# Patient Record
Sex: FEMALE | Race: BLACK OR AFRICAN AMERICAN | Employment: OTHER
[De-identification: names, ages, dates, MRNs, and addresses within clinical notes are randomized per-mention and may not be internally consistent; named-entity substitution may affect disease eponyms.]

---

## 2024-01-01 ENCOUNTER — HOME CARE VISIT (OUTPATIENT)
Facility: HOME HEALTH | Age: 89
End: 2024-01-01
Payer: MEDICARE

## 2024-01-01 ENCOUNTER — HOME CARE VISIT (OUTPATIENT)
Age: 89
End: 2024-01-01
Payer: MEDICARE

## 2024-01-01 VITALS — TEMPERATURE: 97.8 F | DIASTOLIC BLOOD PRESSURE: 30 MMHG | SYSTOLIC BLOOD PRESSURE: 88 MMHG | RESPIRATION RATE: 28 BRPM

## 2024-01-01 VITALS
DIASTOLIC BLOOD PRESSURE: 54 MMHG | OXYGEN SATURATION: 94 % | TEMPERATURE: 100.9 F | SYSTOLIC BLOOD PRESSURE: 72 MMHG | RESPIRATION RATE: 30 BRPM | HEART RATE: 60 BPM

## 2024-01-01 VITALS
DIASTOLIC BLOOD PRESSURE: 65 MMHG | HEART RATE: 59 BPM | RESPIRATION RATE: 11 BRPM | TEMPERATURE: 94.7 F | SYSTOLIC BLOOD PRESSURE: 120 MMHG

## 2024-01-01 VITALS
OXYGEN SATURATION: 96 % | HEART RATE: 64 BPM | DIASTOLIC BLOOD PRESSURE: 68 MMHG | SYSTOLIC BLOOD PRESSURE: 103 MMHG | TEMPERATURE: 96.9 F | RESPIRATION RATE: 10 BRPM

## 2024-01-01 PROCEDURE — G0299 HHS/HOSPICE OF RN EA 15 MIN: HCPCS

## 2024-01-01 ASSESSMENT — ENCOUNTER SYMPTOMS
COUGH: 1
COUGH CHARACTERISTICS: CONGESTED
BOWEL INCONTINENCE: 1

## 2024-05-16 ENCOUNTER — APPOINTMENT (OUTPATIENT)
Facility: HOSPITAL | Age: 89
DRG: 884 | End: 2024-05-16
Payer: MEDICARE

## 2024-05-16 ENCOUNTER — HOSPITAL ENCOUNTER (INPATIENT)
Facility: HOSPITAL | Age: 89
LOS: 4 days | Discharge: HOSPICE | DRG: 884 | End: 2024-05-20
Attending: EMERGENCY MEDICINE | Admitting: FAMILY MEDICINE
Payer: MEDICARE

## 2024-05-16 DIAGNOSIS — T68.XXXA HYPOTHERMIA, INITIAL ENCOUNTER: ICD-10-CM

## 2024-05-16 DIAGNOSIS — R41.82 ALTERED MENTAL STATUS, UNSPECIFIED ALTERED MENTAL STATUS TYPE: Primary | ICD-10-CM

## 2024-05-16 DIAGNOSIS — I95.9 HYPOTENSION, UNSPECIFIED HYPOTENSION TYPE: ICD-10-CM

## 2024-05-16 LAB
ALBUMIN SERPL-MCNC: 2.8 G/DL (ref 3.5–5)
ALBUMIN/GLOB SERPL: 0.8 (ref 1.1–2.2)
ALP SERPL-CCNC: 157 U/L (ref 45–117)
ALT SERPL-CCNC: 156 U/L (ref 12–78)
ANION GAP SERPL CALC-SCNC: 7 MMOL/L (ref 5–15)
APPEARANCE UR: CLEAR
AST SERPL-CCNC: 108 U/L (ref 15–37)
BACTERIA URNS QL MICRO: NEGATIVE /HPF
BASOPHILS # BLD: 0 K/UL (ref 0–0.1)
BASOPHILS NFR BLD: 0 % (ref 0–1)
BILIRUB SERPL-MCNC: 0.9 MG/DL (ref 0.2–1)
BILIRUB UR QL: NEGATIVE
BUN SERPL-MCNC: 23 MG/DL (ref 6–20)
BUN/CREAT SERPL: 26 (ref 12–20)
CALCIUM SERPL-MCNC: 10.2 MG/DL (ref 8.5–10.1)
CHLORIDE SERPL-SCNC: 110 MMOL/L (ref 97–108)
CO2 SERPL-SCNC: 24 MMOL/L (ref 21–32)
COLOR UR: NORMAL
COMMENT:: NORMAL
CREAT SERPL-MCNC: 0.87 MG/DL (ref 0.55–1.02)
DIFFERENTIAL METHOD BLD: ABNORMAL
EOSINOPHIL # BLD: 0.1 K/UL (ref 0–0.4)
EOSINOPHIL NFR BLD: 2 % (ref 0–7)
EPITH CASTS URNS QL MICRO: NORMAL /LPF
ERYTHROCYTE [DISTWIDTH] IN BLOOD BY AUTOMATED COUNT: 17.7 % (ref 11.5–14.5)
GLOBULIN SER CALC-MCNC: 3.7 G/DL (ref 2–4)
GLUCOSE SERPL-MCNC: 150 MG/DL (ref 65–100)
GLUCOSE UR STRIP.AUTO-MCNC: NEGATIVE MG/DL
HCT VFR BLD AUTO: 40.2 % (ref 35–47)
HGB BLD-MCNC: 13.2 G/DL (ref 11.5–16)
HGB UR QL STRIP: NEGATIVE
HYALINE CASTS URNS QL MICRO: NORMAL /LPF (ref 0–5)
IMM GRANULOCYTES # BLD AUTO: 0 K/UL (ref 0–0.04)
IMM GRANULOCYTES NFR BLD AUTO: 0 % (ref 0–0.5)
KETONES UR QL STRIP.AUTO: NEGATIVE MG/DL
LACTATE SERPL-SCNC: 1.2 MMOL/L (ref 0.4–2)
LACTATE SERPL-SCNC: 2.4 MMOL/L (ref 0.4–2)
LEUKOCYTE ESTERASE UR QL STRIP.AUTO: NEGATIVE
LYMPHOCYTES # BLD: 0.8 K/UL (ref 0.8–3.5)
LYMPHOCYTES NFR BLD: 24 % (ref 12–49)
MCH RBC QN AUTO: 28.8 PG (ref 26–34)
MCHC RBC AUTO-ENTMCNC: 32.8 G/DL (ref 30–36.5)
MCV RBC AUTO: 87.6 FL (ref 80–99)
MONOCYTES # BLD: 0.2 K/UL (ref 0–1)
MONOCYTES NFR BLD: 6 % (ref 5–13)
NEUTS SEG # BLD: 2.1 K/UL (ref 1.8–8)
NEUTS SEG NFR BLD: 68 % (ref 32–75)
NITRITE UR QL STRIP.AUTO: NEGATIVE
NRBC # BLD: 0 K/UL (ref 0–0.01)
NRBC BLD-RTO: 0 PER 100 WBC
PH UR STRIP: 5 (ref 5–8)
PLATELET # BLD AUTO: 56 K/UL (ref 150–400)
PLATELET COMMENT: ABNORMAL
POTASSIUM SERPL-SCNC: 4.3 MMOL/L (ref 3.5–5.1)
PROT SERPL-MCNC: 6.5 G/DL (ref 6.4–8.2)
PROT UR STRIP-MCNC: NEGATIVE MG/DL
RBC # BLD AUTO: 4.59 M/UL (ref 3.8–5.2)
RBC #/AREA URNS HPF: NORMAL /HPF (ref 0–5)
RBC MORPH BLD: ABNORMAL
RBC MORPH BLD: ABNORMAL
SODIUM SERPL-SCNC: 141 MMOL/L (ref 136–145)
SP GR UR REFRACTOMETRY: 1.01 (ref 1–1.03)
SPECIMEN HOLD: NORMAL
TSH SERPL DL<=0.05 MIU/L-ACNC: 4.59 UIU/ML (ref 0.36–3.74)
URINE CULTURE IF INDICATED: NORMAL
UROBILINOGEN UR QL STRIP.AUTO: 0.2 EU/DL (ref 0.2–1)
WBC # BLD AUTO: 3.2 K/UL (ref 3.6–11)
WBC URNS QL MICRO: NORMAL /HPF (ref 0–4)

## 2024-05-16 PROCEDURE — 96361 HYDRATE IV INFUSION ADD-ON: CPT

## 2024-05-16 PROCEDURE — 6360000002 HC RX W HCPCS: Performed by: EMERGENCY MEDICINE

## 2024-05-16 PROCEDURE — 2580000003 HC RX 258: Performed by: FAMILY MEDICINE

## 2024-05-16 PROCEDURE — 36415 COLL VENOUS BLD VENIPUNCTURE: CPT

## 2024-05-16 PROCEDURE — 96360 HYDRATION IV INFUSION INIT: CPT

## 2024-05-16 PROCEDURE — 84443 ASSAY THYROID STIM HORMONE: CPT

## 2024-05-16 PROCEDURE — 2060000000 HC ICU INTERMEDIATE R&B

## 2024-05-16 PROCEDURE — 71045 X-RAY EXAM CHEST 1 VIEW: CPT

## 2024-05-16 PROCEDURE — 83605 ASSAY OF LACTIC ACID: CPT

## 2024-05-16 PROCEDURE — 87040 BLOOD CULTURE FOR BACTERIA: CPT

## 2024-05-16 PROCEDURE — 80053 COMPREHEN METABOLIC PANEL: CPT

## 2024-05-16 PROCEDURE — 6360000002 HC RX W HCPCS: Performed by: FAMILY MEDICINE

## 2024-05-16 PROCEDURE — 70450 CT HEAD/BRAIN W/O DYE: CPT

## 2024-05-16 PROCEDURE — 99285 EMERGENCY DEPT VISIT HI MDM: CPT

## 2024-05-16 PROCEDURE — 85025 COMPLETE CBC W/AUTO DIFF WBC: CPT

## 2024-05-16 PROCEDURE — 2580000003 HC RX 258: Performed by: EMERGENCY MEDICINE

## 2024-05-16 PROCEDURE — 74177 CT ABD & PELVIS W/CONTRAST: CPT

## 2024-05-16 PROCEDURE — 81001 URINALYSIS AUTO W/SCOPE: CPT

## 2024-05-16 PROCEDURE — 6360000004 HC RX CONTRAST MEDICATION: Performed by: STUDENT IN AN ORGANIZED HEALTH CARE EDUCATION/TRAINING PROGRAM

## 2024-05-16 RX ORDER — SODIUM CHLORIDE, SODIUM LACTATE, POTASSIUM CHLORIDE, AND CALCIUM CHLORIDE .6; .31; .03; .02 G/100ML; G/100ML; G/100ML; G/100ML
1000 INJECTION, SOLUTION INTRAVENOUS ONCE
Status: COMPLETED | OUTPATIENT
Start: 2024-05-16 | End: 2024-05-16

## 2024-05-16 RX ORDER — ENOXAPARIN SODIUM 100 MG/ML
40 INJECTION SUBCUTANEOUS DAILY
Status: DISCONTINUED | OUTPATIENT
Start: 2024-05-16 | End: 2024-05-16

## 2024-05-16 RX ORDER — 0.9 % SODIUM CHLORIDE 0.9 %
500 INTRAVENOUS SOLUTION INTRAVENOUS ONCE
Status: COMPLETED | OUTPATIENT
Start: 2024-05-16 | End: 2024-05-16

## 2024-05-16 RX ORDER — SODIUM CHLORIDE 9 MG/ML
INJECTION, SOLUTION INTRAVENOUS PRN
Status: DISCONTINUED | OUTPATIENT
Start: 2024-05-16 | End: 2024-05-20 | Stop reason: HOSPADM

## 2024-05-16 RX ORDER — POLYETHYLENE GLYCOL 3350 17 G/17G
17 POWDER, FOR SOLUTION ORAL DAILY PRN
Status: DISCONTINUED | OUTPATIENT
Start: 2024-05-16 | End: 2024-05-20 | Stop reason: HOSPADM

## 2024-05-16 RX ORDER — SODIUM CHLORIDE, SODIUM LACTATE, POTASSIUM CHLORIDE, CALCIUM CHLORIDE 600; 310; 30; 20 MG/100ML; MG/100ML; MG/100ML; MG/100ML
INJECTION, SOLUTION INTRAVENOUS CONTINUOUS
Status: DISCONTINUED | OUTPATIENT
Start: 2024-05-16 | End: 2024-05-20 | Stop reason: HOSPADM

## 2024-05-16 RX ORDER — SODIUM CHLORIDE 0.9 % (FLUSH) 0.9 %
5-40 SYRINGE (ML) INJECTION EVERY 12 HOURS SCHEDULED
Status: DISCONTINUED | OUTPATIENT
Start: 2024-05-16 | End: 2024-05-20 | Stop reason: HOSPADM

## 2024-05-16 RX ORDER — ACETAMINOPHEN 325 MG/1
650 TABLET ORAL EVERY 6 HOURS PRN
Status: DISCONTINUED | OUTPATIENT
Start: 2024-05-16 | End: 2024-05-20 | Stop reason: HOSPADM

## 2024-05-16 RX ORDER — ONDANSETRON 2 MG/ML
4 INJECTION INTRAMUSCULAR; INTRAVENOUS EVERY 6 HOURS PRN
Status: DISCONTINUED | OUTPATIENT
Start: 2024-05-16 | End: 2024-05-20 | Stop reason: HOSPADM

## 2024-05-16 RX ORDER — ONDANSETRON 4 MG/1
4 TABLET, ORALLY DISINTEGRATING ORAL EVERY 8 HOURS PRN
Status: DISCONTINUED | OUTPATIENT
Start: 2024-05-16 | End: 2024-05-20 | Stop reason: HOSPADM

## 2024-05-16 RX ORDER — POTASSIUM CHLORIDE 7.45 MG/ML
10 INJECTION INTRAVENOUS PRN
Status: DISCONTINUED | OUTPATIENT
Start: 2024-05-16 | End: 2024-05-20 | Stop reason: HOSPADM

## 2024-05-16 RX ORDER — MAGNESIUM SULFATE IN WATER 40 MG/ML
2000 INJECTION, SOLUTION INTRAVENOUS PRN
Status: DISCONTINUED | OUTPATIENT
Start: 2024-05-16 | End: 2024-05-20 | Stop reason: HOSPADM

## 2024-05-16 RX ORDER — POTASSIUM CHLORIDE 750 MG/1
40 TABLET, FILM COATED, EXTENDED RELEASE ORAL PRN
Status: DISCONTINUED | OUTPATIENT
Start: 2024-05-16 | End: 2024-05-20 | Stop reason: HOSPADM

## 2024-05-16 RX ORDER — ACETAMINOPHEN 650 MG/1
650 SUPPOSITORY RECTAL EVERY 6 HOURS PRN
Status: DISCONTINUED | OUTPATIENT
Start: 2024-05-16 | End: 2024-05-20 | Stop reason: HOSPADM

## 2024-05-16 RX ORDER — SODIUM CHLORIDE 0.9 % (FLUSH) 0.9 %
5-40 SYRINGE (ML) INJECTION PRN
Status: DISCONTINUED | OUTPATIENT
Start: 2024-05-16 | End: 2024-05-20 | Stop reason: HOSPADM

## 2024-05-16 RX ADMIN — PIPERACILLIN AND TAZOBACTAM 4500 MG: 4; .5 INJECTION, POWDER, LYOPHILIZED, FOR SOLUTION INTRAVENOUS at 15:48

## 2024-05-16 RX ADMIN — SODIUM CHLORIDE 500 ML: 9 INJECTION, SOLUTION INTRAVENOUS at 13:29

## 2024-05-16 RX ADMIN — IOPAMIDOL 80 ML: 755 INJECTION, SOLUTION INTRAVENOUS at 16:18

## 2024-05-16 RX ADMIN — SODIUM CHLORIDE, POTASSIUM CHLORIDE, SODIUM LACTATE AND CALCIUM CHLORIDE 1000 ML: 600; 310; 30; 20 INJECTION, SOLUTION INTRAVENOUS at 15:48

## 2024-05-16 RX ADMIN — SODIUM CHLORIDE 1250 MG: 9 INJECTION, SOLUTION INTRAVENOUS at 18:04

## 2024-05-16 RX ADMIN — PIPERACILLIN SODIUM AND TAZOBACTAM SODIUM 3375 MG: 3; .375 INJECTION, POWDER, LYOPHILIZED, FOR SOLUTION INTRAVENOUS at 21:40

## 2024-05-16 RX ADMIN — SODIUM CHLORIDE, POTASSIUM CHLORIDE, SODIUM LACTATE AND CALCIUM CHLORIDE: 600; 310; 30; 20 INJECTION, SOLUTION INTRAVENOUS at 18:08

## 2024-05-16 RX ADMIN — SODIUM CHLORIDE, PRESERVATIVE FREE 10 ML: 5 INJECTION INTRAVENOUS at 21:38

## 2024-05-16 NOTE — ED PROVIDER NOTES
[facility] EMERGENCY DEP  EMERGENCY DEPARTMENT ENCOUNTER      Pt Name: Lina Durant  MRN: [id number]  Birthdate 10/5/1933  Date of evaluation: 5/16/2024  Provider: Matthew Brantley MD      HISTORY OF PRESENT ILLNESS      89-year-old female with history of hypertension presents to the emergency department with daughter with concern for altered mental status.  Daughter reports worsening p.o. intake, decreased conversation, may be indicating some headaches and abdominal pain for least the last several days.    The history is provided by medical records and a relative.           Nursing Notes were reviewed.    REVIEW OF SYSTEMS         Review of Systems        PAST MEDICAL HISTORY   No past medical history on file.      SURGICAL HISTORY     No past surgical history on file.      CURRENT MEDICATIONS       Previous Medications    No medications on file       ALLERGIES     Patient has no known allergies.    FAMILY HISTORY     No family history on file.       SOCIAL HISTORY       Social History     Socioeconomic History    Marital status: Single         PHYSICAL EXAM       ED Triage Vitals [05/16/24 1156]   BP Temp Temp src Pulse Respirations SpO2 Height Weight - Scale   117/88 -- -- 62 16 -- 1.6 m (5' 3\") 52.2 kg (115 lb)       Body mass index is 20.37 kg/m².    Physical Exam  Vitals and nursing note reviewed.   Constitutional:       General: She is not in acute distress.     Appearance: She is not ill-appearing.      Comments: Patient looking around room, regards me as I enter the room and examined her, moves all 4 extremities but does not interact with me   HENT:      Mouth/Throat:      Mouth: Mucous membranes are moist.   Pulmonary:      Effort: No respiratory distress.   Neurological:      General: No focal deficit present.             EMERGENCY DEPARTMENT COURSE and DIFFERENTIAL DIAGNOSIS/MDM:   Vitals:    Vitals:    05/16/24 1156   BP: 117/88   Pulse: 62   Resp: 16   Weight: 52.2 kg (115 lb)   Height: 1.6 m (5' 3\")

## 2024-05-16 NOTE — H&P
History and Physical    Date of Service:  5/16/2024  Primary Care Provider: Hai Valera MD  Source of information: The patient and Chart review    Chief Complaint: Altered Mental Status      History of Presenting Illness:   Lina Durant is a 89 y.o. female who presents with concern of altered mental status.  Patient presented to the emergency room accompanied by her daughter, patient has been lethargic and having more weakness and needing assistance with her ambulation.  Patient has advanced dementia and daughter takes care of the patient at home.  According to the daughter, patient has been falling intermittently since September of last year, however patient maintaining some independence with ADLs.  Over the last few days, patient with significantly poor p.o. intake, talking less, also needing to assist with feeding which is new.  Daughter also reports that patient may be having headache and abdominal pain for past few days.  Patient presented to the emergency room and found to be hypothermic with temperature of 90.9.  Labs are fairly unremarkable and no other indicators of sepsis.  Noted with elevated lactic acid level.  CT head was negative for acute pathology, UA was unremarkable, chest x-ray and CT abdomen and pelvis pending at this time.  Patient was started on broad-spectrum antibiotics with vancomycin and Zosyn, blood cultures were obtained and hospitalist service requested to admit the patient for further evaluation management.    The patient denies any headache, blurry vision, sore throat, trouble swallowing, trouble with speech, chest pain, SOB, cough, fever, chills, N/V/D, abd pain, urinary symptoms, constipation, recent travels, sick contacts, focal or generalized neurological symptoms, falls, injuries, rashes, contact with COVID-19 diagnosed patients, hematemesis, melena, hemoptysis, hematuria, rashes, denies starting any new medications and denies any other concerns or problems besides as  mentioned above.         REVIEW OF SYSTEMS:  A comprehensive review of systems was negative except for that written in the History of Present Illness.     No past medical history on file.   No past surgical history on file.  Prior to Admission medications    Not on File     No Known Allergies   No family history on file.   Social History:     Social Determinants of Health     Tobacco Use: Not on file   Alcohol Use: Not on file   Financial Resource Strain: Not on file   Food Insecurity: Not on file   Transportation Needs: Not on file   Physical Activity: Not on file   Stress: Not on file   Social Connections: Not on file   Intimate Partner Violence: Not on file   Depression: Not on file   Housing Stability: Not on file   Interpersonal Safety: Not on file   Utilities: Not on file        Medications were reconciled to the best of my ability given all available resources at the time of admission. Route is PO if not otherwise noted.     Family and social history were personally reviewed, all pertinent and relevant details are outlined as above.    Objective:   /88   Pulse 62   Temp (!) 90.9 °F (32.7 °C) (Rectal)   Resp 16   Ht 1.6 m (5' 3\")   Wt 52.2 kg (115 lb)   SpO2 98%   BMI 20.37 kg/m²         PHYSICAL EXAM:   General: Alert x oriented x 3, awake, no acute distress,   HEENT: PEERL, EOMI, moist mucus membranes  Neck: Supple, no JVD, no meningeal signs  Chest: Clear to auscultation bilaterally   CVS: RRR, S1 S2 heard, no murmurs/rubs/gallops  Abd: Soft, non-tender, non-distended, +bowel sounds   Ext: No clubbing, no cyanosis, no edema  Neuro/Psych: Pleasant mood and affect, CN 2-12 grossly intact, sensory grossly within normal limit, Strength 5/5 in all extremities, DTR 1+ x 4  Cap refill: Brisk, less than 3 seconds  Pulses: 2+, symmetric in all extremities  Skin: Warm, dry, without rashes or lesions    Data Review:   I have independently reviewed and interpreted patient's lab and all other diagnostic

## 2024-05-16 NOTE — ED NOTES
TRANSFER - OUT REPORT:    Verbal report given to Shwetha on Lina Durant  being transferred to [facility] for routine progression of patient care       Report consisted of patient's Situation, Background, Assessment and   Recommendations(SBAR).     Information from the following report(s) Nurse Handoff Report, Index, ED Encounter Summary, ED SBAR, and Adult Overview was reviewed with the receiving nurse.    [place] Fall Assessment:    Presents to emergency department  because of falls (Syncope, seizure, or loss of consciousness): Yes  Age > 70: Yes  Altered Mental Status, Intoxication with alcohol or substance confusion (Disorientation, impaired judgment, poor safety awaremess, or inability to follow instructions): Yes  Impaired Mobility: Ambulates or transfers with assistive devices or assistance; Unable to ambulate or transer.: Yes  Nursing Judgement: Yes          Lines:   Peripheral IV 05/16/24 Left;Proximal;Anterior Forearm (Active)       Peripheral IV 05/16/24 Right Forearm (Active)        Opportunity for questions and clarification was provided.      Patient transported with:  Monitor and Registered Nurse

## 2024-05-16 NOTE — ED TRIAGE NOTES
Pt to er via ems from home for c/o increased lethargy, slurred speech and difficulty with ADLS for the last few weeks. Pt has advanced stage dementia. Per daughter at bedside pt stated this morning that she had a stomach ache and a headache.

## 2024-05-16 NOTE — PROGRESS NOTES
Pharmacist Note - Vancomycin Dosing    Consult provided for this 89 y.o. female admitted to the ED with AMS & hypothermia.  Antibiotic regimen(s): Vanc + Zosyn    Recent Labs     24  1310   WBC 3.2*   CREATININE 0.87   BUN 23*     Frequency of BMP: daily x 3  Height: 160 cm  Weight: 52.2 kg  Est CrCl: 35 ml/min  Temp (24hrs), Av.6 °F (32.6 °C), Min:90.3 °F (32.4 °C), Max:90.9 °F (32.7 °C)    The plan below is expected to result in a target range of AUC/JULIET 400-600    A 1250 mg loading dose was ordered in the ED; to be followed by a maintenance dose of 1000 mg IV every 24 hours.  Pharmacy to follow patient daily and order levels / make dose adjustments as appropriate.    *Vancomycin has been dosed used Bayesian kinetics software to target an AUC/JULIET of 400-600, which provides adequate exposure for an assumed infection due to MRSA with an JULIET of 1 or less while reducing the risk of nephrotoxicity as seen with traditional trough based dosing goals.

## 2024-05-17 ENCOUNTER — HOSPICE ADMISSION (OUTPATIENT)
Age: 89
End: 2024-05-17
Payer: MEDICARE

## 2024-05-17 PROBLEM — Z51.5 PALLIATIVE CARE ENCOUNTER: Status: ACTIVE | Noted: 2024-05-17

## 2024-05-17 PROBLEM — R53.81 DEBILITY: Status: ACTIVE | Noted: 2024-05-17

## 2024-05-17 PROBLEM — F03.C0 SEVERE DEMENTIA WITHOUT BEHAVIORAL DISTURBANCE, PSYCHOTIC DISTURBANCE, MOOD DISTURBANCE, OR ANXIETY (HCC): Status: ACTIVE | Noted: 2024-05-17

## 2024-05-17 PROBLEM — R63.30 FEEDING DIFFICULTIES: Status: ACTIVE | Noted: 2024-05-17

## 2024-05-17 LAB
ANION GAP SERPL CALC-SCNC: 7 MMOL/L (ref 5–15)
BASE DEFICIT BLD-SCNC: 1.4 MMOL/L
BASOPHILS # BLD: 0 K/UL (ref 0–0.1)
BASOPHILS NFR BLD: 0 % (ref 0–1)
BUN SERPL-MCNC: 21 MG/DL (ref 6–20)
BUN/CREAT SERPL: 19 (ref 12–20)
CALCIUM SERPL-MCNC: 9.8 MG/DL (ref 8.5–10.1)
CHLORIDE SERPL-SCNC: 113 MMOL/L (ref 97–108)
CO2 SERPL-SCNC: 24 MMOL/L (ref 21–32)
CREAT SERPL-MCNC: 1.09 MG/DL (ref 0.55–1.02)
DIFFERENTIAL METHOD BLD: ABNORMAL
EOSINOPHIL # BLD: 0 K/UL (ref 0–0.4)
EOSINOPHIL NFR BLD: 1 % (ref 0–7)
ERYTHROCYTE [DISTWIDTH] IN BLOOD BY AUTOMATED COUNT: 17.2 % (ref 11.5–14.5)
GLUCOSE BLD STRIP.AUTO-MCNC: 81 MG/DL (ref 65–117)
GLUCOSE SERPL-MCNC: 67 MG/DL (ref 65–100)
HCO3 BLD-SCNC: 23 MMOL/L (ref 22–26)
HCT VFR BLD AUTO: 33.9 % (ref 35–47)
HGB BLD-MCNC: 11 G/DL (ref 11.5–16)
IMM GRANULOCYTES # BLD AUTO: 0 K/UL (ref 0–0.04)
IMM GRANULOCYTES NFR BLD AUTO: 0 % (ref 0–0.5)
LYMPHOCYTES # BLD: 0.9 K/UL (ref 0.8–3.5)
LYMPHOCYTES NFR BLD: 25 % (ref 12–49)
MCH RBC QN AUTO: 28.4 PG (ref 26–34)
MCHC RBC AUTO-ENTMCNC: 32.4 G/DL (ref 30–36.5)
MCV RBC AUTO: 87.4 FL (ref 80–99)
MONOCYTES # BLD: 0.4 K/UL (ref 0–1)
MONOCYTES NFR BLD: 11 % (ref 5–13)
NEUTS SEG # BLD: 2.2 K/UL (ref 1.8–8)
NEUTS SEG NFR BLD: 63 % (ref 32–75)
NRBC # BLD: 0 K/UL (ref 0–0.01)
NRBC BLD-RTO: 0 PER 100 WBC
PCO2 BLD: 36.7 MMHG (ref 35–45)
PH BLD: 7.41 (ref 7.35–7.45)
PLATELET # BLD AUTO: 59 K/UL (ref 150–400)
PO2 BLD: 72 MMHG (ref 80–100)
POTASSIUM SERPL-SCNC: 4.4 MMOL/L (ref 3.5–5.1)
RBC # BLD AUTO: 3.88 M/UL (ref 3.8–5.2)
RBC MORPH BLD: ABNORMAL
SAO2 % BLD: 94.3 % (ref 92–97)
SERVICE CMNT-IMP: NORMAL
SODIUM SERPL-SCNC: 144 MMOL/L (ref 136–145)
SPECIMEN TYPE: ABNORMAL
WBC # BLD AUTO: 3.5 K/UL (ref 3.6–11)

## 2024-05-17 PROCEDURE — 36600 WITHDRAWAL OF ARTERIAL BLOOD: CPT

## 2024-05-17 PROCEDURE — 99223 1ST HOSP IP/OBS HIGH 75: CPT | Performed by: PHYSICAL MEDICINE & REHABILITATION

## 2024-05-17 PROCEDURE — 85025 COMPLETE CBC W/AUTO DIFF WBC: CPT

## 2024-05-17 PROCEDURE — 2580000003 HC RX 258: Performed by: FAMILY MEDICINE

## 2024-05-17 PROCEDURE — 36415 COLL VENOUS BLD VENIPUNCTURE: CPT

## 2024-05-17 PROCEDURE — 2060000000 HC ICU INTERMEDIATE R&B

## 2024-05-17 PROCEDURE — 82962 GLUCOSE BLOOD TEST: CPT

## 2024-05-17 PROCEDURE — 92610 EVALUATE SWALLOWING FUNCTION: CPT | Performed by: SPEECH-LANGUAGE PATHOLOGIST

## 2024-05-17 PROCEDURE — 82803 BLOOD GASES ANY COMBINATION: CPT

## 2024-05-17 PROCEDURE — 2580000003 HC RX 258

## 2024-05-17 PROCEDURE — 80048 BASIC METABOLIC PNL TOTAL CA: CPT

## 2024-05-17 PROCEDURE — 6360000002 HC RX W HCPCS: Performed by: FAMILY MEDICINE

## 2024-05-17 RX ORDER — DEXTROSE MONOHYDRATE AND SODIUM CHLORIDE 5; .9 G/100ML; G/100ML
INJECTION, SOLUTION INTRAVENOUS CONTINUOUS
Status: DISCONTINUED | OUTPATIENT
Start: 2024-05-17 | End: 2024-05-19

## 2024-05-17 RX ADMIN — PIPERACILLIN SODIUM AND TAZOBACTAM SODIUM 3375 MG: 3; .375 INJECTION, POWDER, LYOPHILIZED, FOR SOLUTION INTRAVENOUS at 21:00

## 2024-05-17 RX ADMIN — PIPERACILLIN SODIUM AND TAZOBACTAM SODIUM 3375 MG: 3; .375 INJECTION, POWDER, LYOPHILIZED, FOR SOLUTION INTRAVENOUS at 06:18

## 2024-05-17 RX ADMIN — PIPERACILLIN SODIUM AND TAZOBACTAM SODIUM 3375 MG: 3; .375 INJECTION, POWDER, LYOPHILIZED, FOR SOLUTION INTRAVENOUS at 14:18

## 2024-05-17 RX ADMIN — SODIUM CHLORIDE, PRESERVATIVE FREE 10 ML: 5 INJECTION INTRAVENOUS at 21:01

## 2024-05-17 RX ADMIN — SODIUM CHLORIDE, POTASSIUM CHLORIDE, SODIUM LACTATE AND CALCIUM CHLORIDE: 600; 310; 30; 20 INJECTION, SOLUTION INTRAVENOUS at 05:14

## 2024-05-17 RX ADMIN — DEXTROSE AND SODIUM CHLORIDE: 5; 900 INJECTION, SOLUTION INTRAVENOUS at 12:19

## 2024-05-17 RX ADMIN — SODIUM CHLORIDE, PRESERVATIVE FREE 10 ML: 5 INJECTION INTRAVENOUS at 08:52

## 2024-05-17 NOTE — CARE COORDINATION
CM acknowledges order for hospice info session. Pt sent referral to [facility] for info session via [facility]. CM will continue to follow and provide support as needed.

## 2024-05-17 NOTE — CONSULTS
Palliative Medicine  Patient Name: Lina Durant  YOB: 1933  MRN: [id number]  Age: 89 y.o.  Gender: female    Date of Initial Consult: 5/17/24  Date of Service: 5/17/2024  Time: 3:27 PM  Provider: Amelia Ashton MD  Hospital Day: 2  Admit Date: 5/16/2024  Referring Provider: Dr Velazco       Reasons for Consultation:  Goals of Care    HISTORY OF PRESENT ILLNESS (HPI):   Lina Durant is a 89 y.o. female with a past medical history of advanced dementia dx ~ 2009 w/ falls intermittently since 9/2023 who was admitted on 5/16/2024 from home after dtr Kianna noticing poor po intake, decr speaking, abdominal pain over past few days. Hypothermic in ED. CT of head wnl, CT A/P w/ no source of infection. On IV abx. RRT called for AMS- MRI ordered, although also considering end stage dementia as contributing factor.     Dtr states that when saw Neurology a few months ago was told there was nothing else that could be done, that she has end stage dementia. Has declined significantly over past few weeks, needing more assistance, not always recognizing family, pocketing food.       Psychosocial: Pt lived at the [facility] for 20 years, moved into dtr's home 2/2024. Dtr Kianna is only child. Pt has always lived in [place], one of 8 children- 3 still living. 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     PALLIATIVE DIAGNOSES:    Severe dementia  Debility   Feeding difficulties  Palliative care encounter    ASSESSMENT AND PLAN:   Meet w/ pt who is unarousable to voice, has not been able to work w/ therapies.   Supportive dtr Kianna at bedside- she is next of kin, only child.   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   Moved in w/ dtr Kianna 2/2024 and up until recently could still get OOB w/ assistance and was

## 2024-05-17 NOTE — CARE COORDINATION
Care Management Initial Assessment       RUR: 11%  Readmission? No  1st IM letter given? Yes - 5/17/24  1st  letter given: No      CM at the bedside with pt and pt daughter to verify insurance info and demographics. Pt currently sedated and unable to be assessed. Bedside nurse made aware.     Pt currently lives with her daughter in a 2 level home and the pt resides downstairs. Pt needs standby assistance inside and outside she uses a wheelchair. She needs assistance with ADLs as well. Pt has WC (baseline), rollator (unable to use any longer), and cane (unable to use any longer). Pt has no hx of SNF/IPR/HH. Pt daughter open to PT/OT recommendations and would like rehabilitation for her mother. CM provided explanation that PT/OT will evaluate and recommend what is best for pt and what she is able to tolerate. Pt daughter verbalized understanding. For now, pt daughter has no preferences in agency or facilities. Pt daughter interested in [facility] and would like more information on personal aides at home as well. CM will provide personal aide list as well. PCP confirmed. CM will continue to follow.      05/17/24 1252   Service Assessment   Patient Orientation Sedated;Unresponsive;Other (see comment)  (Spoke to daughter Kianna)   Cognition Dementia / Early Alzheimer's   History Provided By Child/Family   Primary Caregiver Family   Accompanied By/Relationship Asha Wetzel / Daughter   Support Systems Children   Patient's Healthcare Decision Maker is: Legal Next of Kin   PCP Verified by CM Yes   Last Visit to PCP Within last 3 months   Prior Functional Level Assistance with the following:;Bathing;Dressing;Toileting;Feeding;Cooking;Housework;Shopping;Mobility   Current Functional Level Assistance with the following:;Bathing;Dressing;Toileting;Feeding;Cooking;Shopping;Housework;Mobility   Can patient return to prior living arrangement Unknown at present   Ability to make needs known: Poor   Family able to assist with home

## 2024-05-17 NOTE — PLAN OF CARE
Speech LAnguage Pathology EVALUATION    Patient: Lina Durant (89 y.o. female)  Date: 5/17/2024  Primary Diagnosis: Hypothermia, initial encounter [T68.XXXA]  Hypotension, unspecified hypotension type [I95.9]  Altered mental status, unspecified altered mental status type [R41.82]       Precautions:  fall                    ASSESSMENT :  Patient admitted with decreased responsiveness and hypothermia. Head CT \"No acute abnormality.\" Chest X ray \"IMPRESSION: 1. The lungs demonstrate reticulation at the lung bases suggesting fibrosis. 2. Cardiomegaly\" . Per discussion with patient's daughter, she was diagnosed with dementia in 2006. Up until February of this year, she was eating and drinking reasonably well. Her daughter noticed some decline and moved the patient into her home to better care for her. The patient has shown pocketing of food and medications for about 2 months. She stopped having the ability to self feed this past weekend. Speech has been intermittently distorted. The patient has had multiple falls.   Patient is currently lethargic. She did not rouse to her daughter washing her face. Brief eye opening with repositioning in bed. No command following. She opened her mouth to accept an ice chip, though when an ice chip was placed in her mouth, it immediately fell out. No further attempts given lethargy.   Explained to daughter that patient's lethargy places her at increased risk for aspiration. Daughter understands. She was asking what the next steps are as they relate to her mother's nutritional status. Explained that a decline in PO intake with pocketing and eventual inability to swallow is often seen as a part of the dementia process. If this is the case, we do not expect improvement. If there is an underlying medical condition that would explain her lethargy and lack of acceptance of PO, we would expect improvement with treatment of that condition. Encouraged her to continue this conversation with her  Stefany dominique, SLP  Minutes: 20   Problem: SLP Adult - Impaired Swallowing  Goal: By Discharge: Advance to least restrictive diet without signs or symptoms of aspiration for planned discharge setting.  See evaluation for individualized goals.  Description: Initiated 5/17/2024  1. Patient will participated in re-assessment of swallow function within 7 days.   Outcome: Progressing

## 2024-05-17 NOTE — CONSULTS
Comprehensive Nutrition Assessment    Type and Reason for Visit: Initial, Consult    Nutrition Recommendations/Plan:     Agree with palliative involvement to assist with goals of care  For now, if alertness improves and diet initiated, assist with feeding.  Start ONS (chocolate) at that time for ease/concentration of nutrients in order to maximize intake.    Continue IV hydration for now - but consider adjusting to dex containing for protein sparing calories.  Would try to avoid NG placement/ nutrition support.         Malnutrition Assessment:  Malnutrition Status:  Severe malnutrition (05/17/24 1402)    Context:  Chronic Illness     Findings of the 6 clinical characteristics of malnutrition:  Energy Intake:  75% or less estimated energy requirements for 1 month or longer  Weight Loss:  Unable to assess     Body Fat Loss:  Unable to assess     Muscle Mass Loss:  Severe muscle mass loss Temples (temporalis), Clavicles (pectoralis & deltoids), Hand (interosseous)  Fluid Accumulation:  No significant fluid accumulation     Strength:  Not Performed       Nutrition Assessment:    PMHx includes end stage dementia.  Pt admitted with hypothermia, AMS/acute metabolic encephalopathy.    RD consulted for poor PO intake.  Palliative consult for goals also pending.  SLP abdi this AM reviewed: recommends NPO with pt's lethargy.  OK to adjust diet if alertness improves or if goals shift towards comfort.  Daughter reported that up until February of this year, pt was eating and drinking reasonably well.  When daughter noticed decline, pt was moved in with her and she noted that pt had been pocketing her food and meds for past 2 months.  As of this weekend, pt was no longer able to feed herself.  Pt is edentulous and tends to not wear, so they are at home.    When I spoke with daughter a little later, she reports that pt was actually still eating/ drinking reasonably well until this past weekend.    She states that she would  prepare the meals and pt would feed herself.  Eating things like chicken, PB&J sandwiches, hamburgers, hot dogs - even without her teeth.  Just cannot do salads/ stringy items without dentures.  Daughter is not sure of wt hx/ UBW, but does not that pt has been gradually losing weight over the years and was 95 lb at MD office a few ago.  Pt does exhibit significant muscle wasting and has very little SQ fat.    Daughter has offered Ensure in the past and pt likes chocolate.  However, tends to give her loose stools, so daughter has to time it right when she gives it.  Pt also gets loose stools from applesauce, bananas, and Nutrigrain bars.  She states loose stools have also gradually become a thing with these foods.  At this time, she is told that pt is constipated, so thinks Ensure might assist with this.    Agree with palliative care involvement.  If diet is unable to be advanced, given end stage dementia, nutrition support is contraindicated.      Nutritionally Significant Medications:  Zosyn, vancomycin, LR@ 75 mL/hr      Estimated Daily Nutrient Needs:  Energy Requirements Based On: Kcal/kg  Weight Used for Energy Requirements: Admission  Energy (kcal/day): 1300 (25 kcal/kg)  Weight Used for Protein Requirements: Admission  Protein (g/day): 50-60 (1-1.2 gm/kg)     Fluid (ml/day): 1500 (older adult)    Nutrition Related Findings:   Edema: none                           Last BM:      Wounds:   Wound Type: None      Current Nutrition Therapies:  Diet: NPO  Supplements: NPO  Meal Intake:   No data found.  Supplement Intake:  No data found.      Anthropometric Measures:  Height: 160 cm (5' 3\")  Ideal Body Weight (IBW): 115 lbs (52 kg)    Admission Body Weight: 52.2 kg (115 lb)  Current Body Weight: 52.2 kg (115 lb 1.3 oz), 100.1 % IBW. Weight Source: Bed Scale  Current BMI (kg/m2): 20.4        Weight Adjustment For: No Adjustment                 BMI Categories: Underweight (BMI less than 22) age over 65    Wt Readings

## 2024-05-17 NOTE — SIGNIFICANT EVENT
Rapid Response Note     05/17/24 at 1150    A rapid response was called on this patient for Altered level of consciousness.    Response    Rapid Response Team at the bedside for evaluation.    Dr. Bahena responding at the bedside.    Shwetha LANGSTON is the primary nurse during this episode.    Situation    The patient was noted to be lethargic and having a left facial droop.  She is lying on her left side.  ABG performed.  MRI is ordered per the attending team.  This is the way the patient was presenting earlier today per the daughter.    The patient was left in the care of her primary nursing team.    Rapid Response end time approximately 1205        Sepsis Screening  Are two or more SIRS criteria present? No    Is the patient's history suggestive of a new infection? No    Communication with provider:    Was a Code Sepsis called at this encounter? No

## 2024-05-17 NOTE — PROGRESS NOTES
[facility]  Hospitalist Group                                                                                          Hospitalist Progress Note  Celine Simental PA-C  Answering service: [phone] OR [phone] from in house phone        Date of Service:  2024  NAME:  Lina Durant  :  10/5/1933  MRN:  [id number]       Admission Summary:   Lina Durant is a 89 y.o. female who presented to the ED on  with concern of altered mental status.  Patient presented to the emergency room accompanied by her daughter. Daughter stated the patient had been lethargic and having more weakness and needing assistance with her ambulation.  Patient has advanced dementia and daughter takes care of the patient at home.  According to the daughter, patient has been falling intermittently since September of last year, however patient maintaining some independence with ADLs.  Over the last few days, patient with significantly poor p.o. intake, talking less, also needing to assist with feeding which is new.  Daughter also reports that patient may be having headache and abdominal pain for past few days.      ED Course: Patient presented to the emergency room and found to be hypothermic with temperature of 90.9. Labs are fairly unremarkable and no other indicators of sepsis. Noted with elevated lactic acid level.  CT head was negative for acute pathology, UA was unremarkable, chest x-ray demonstrated reticulation at the lung bases suggesting fibrosis and cardiomegaly. CT abdomen and pelvis showed distal constipation with rectal distention, severe atherosclerosis of the aorta, and cardiomegaly with no source of infection identified.  Patient was started on broad-spectrum antibiotics with vancomycin and Zosyn, blood cultures were obtained and hospitalist service requested to admit the patient for further evaluation management.  Interval history / Subjective:   Upon seeing the patient on rounds, she was sleeping      ______________________________________________________________________  EXPECTED LENGTH OF STAY: Unable to retrieve estimated LOS  ACTUAL LENGTH OF STAY:          1                 Celine Simental PA-C

## 2024-05-17 NOTE — CONSULTS
Attempted to see patient thrice today and each time there was a family meeting/  other specialty guero  Discussed with Dr. Rivero  Given clinical conditions considering hospice    Consult has been canceled

## 2024-05-17 NOTE — PROGRESS NOTES
[facility]  Good Help to Those in Need  [phone]     Patient Name: Lina Durant  YOB: 1933  Age: 89 y.o.    [facility] RN Note:  Hospice consult received, reviewing chart. Will follow up with Unit Nurse and Care Manager to discuss plan of care, patient status and discharge disposition.    [facility] has confirmed [facility] home address is currently full. [facility] is unable to provide routine Hospice care at pt home address of:  [address] [place], [place] [id number]     [facility] will need to defer to another hospice company that is able to provide hospice support for patient and family.    Currently, patient does not appear to be having acute symptoms that would necessitate Hospice services as GIP. Pt is tolerating oral medications and has not received any PRN medications for symptoms.     Perfect [employer] message sent to  to alert.    Thank you for the opportunity to be of service to this patient.    Charley Blake RN, [facility]  Hospice Nurse Liaison  [phone] Mobile  [phone] Office  Available on Perfect Serve

## 2024-05-17 NOTE — PROGRESS NOTES
PT Contact Note    Pt consult received, orders acknowledged, chart reviewed. Discussed pt with RN, who states at this time, pt is unarousable, unable to participate in PT evaluation. Will defer eval at this time and plan to follow up as able and when patient is appropriate.    Thank you,  Lorena Alexander, PT, DPT

## 2024-05-17 NOTE — PROGRESS NOTES
Spiritual Care Assessment/Progress Note  [facility]    Name: Lina Duratn MRN: [id number]    Age: 89 y.o.     Sex: female   Language: English     Date: 5/17/2024            Total Time Calculated: 35 min              Spiritual Assessment begun in [facility]  Service Provided For: Family  Referral/Consult From: Palliative Care  Encounter Overview/Reason: Palliative Care    Spiritual beliefs:      [x] Involved in a luis tradition/spiritual practice: Mormonism      [] Supported by a luis community:      [] Claims no spiritual orientation:      [] Seeking spiritual identity:           [] Adheres to an individual form of spirituality:      [] Not able to assess:                Identified resources for coping and support system:   Support System: Children, Palliative Care       [x] Prayer                  [] Devotional reading               [] Music                  [] Guided Imagery     [] Pet visits                                        [] Other: (COMMENT)     Specific area/focus of visit   Encounter:    Crisis:    Spiritual/Emotional needs: Type: Spiritual Support  Ritual, Rites and Sacraments:    Grief, Loss, and Adjustments: Type: Anticipatory Grief  Ethics/Mediation:    Behavioral Health:    Palliative Care: Type: Palliative Care, Family Care  Advance Care Planning:      I visited Lina Durant for a palliative initial spiritual assessment. Her chart was consulted prior to the visit. I discussed her with palliative dr Ashton prior to the visit, as well.  The patient was asleep and remained so for the duration of my visit.   I met her only child, daughter Kianna at bedside. She was often tearful during the conversation processing the anticipatory grief incumbent to such a loss. Kianna is struggling with her mother's decline and her understanding that she is near the EOL. The patient lived at the [facility] until earlier this year when Kianna moved her into her home, believing she could no longer live

## 2024-05-18 LAB
ANION GAP SERPL CALC-SCNC: 5 MMOL/L (ref 5–15)
BUN SERPL-MCNC: 18 MG/DL (ref 6–20)
BUN/CREAT SERPL: 16 (ref 12–20)
CALCIUM SERPL-MCNC: 9.7 MG/DL (ref 8.5–10.1)
CHLORIDE SERPL-SCNC: 115 MMOL/L (ref 97–108)
CO2 SERPL-SCNC: 24 MMOL/L (ref 21–32)
CREAT SERPL-MCNC: 1.16 MG/DL (ref 0.55–1.02)
GLUCOSE SERPL-MCNC: 126 MG/DL (ref 65–100)
POTASSIUM SERPL-SCNC: 3.7 MMOL/L (ref 3.5–5.1)
SODIUM SERPL-SCNC: 144 MMOL/L (ref 136–145)
VANCOMYCIN SERPL-MCNC: 9.7 UG/ML

## 2024-05-18 PROCEDURE — 97166 OT EVAL MOD COMPLEX 45 MIN: CPT

## 2024-05-18 PROCEDURE — 80202 ASSAY OF VANCOMYCIN: CPT

## 2024-05-18 PROCEDURE — 97530 THERAPEUTIC ACTIVITIES: CPT

## 2024-05-18 PROCEDURE — 2580000003 HC RX 258: Performed by: FAMILY MEDICINE

## 2024-05-18 PROCEDURE — 97535 SELF CARE MNGMENT TRAINING: CPT

## 2024-05-18 PROCEDURE — 36415 COLL VENOUS BLD VENIPUNCTURE: CPT

## 2024-05-18 PROCEDURE — 6360000002 HC RX W HCPCS: Performed by: FAMILY MEDICINE

## 2024-05-18 PROCEDURE — 80048 BASIC METABOLIC PNL TOTAL CA: CPT

## 2024-05-18 PROCEDURE — 97161 PT EVAL LOW COMPLEX 20 MIN: CPT

## 2024-05-18 PROCEDURE — 2580000003 HC RX 258

## 2024-05-18 PROCEDURE — 1100000000 HC RM PRIVATE

## 2024-05-18 RX ADMIN — SODIUM CHLORIDE, PRESERVATIVE FREE 10 ML: 5 INJECTION INTRAVENOUS at 09:52

## 2024-05-18 RX ADMIN — PIPERACILLIN SODIUM AND TAZOBACTAM SODIUM 3375 MG: 3; .375 INJECTION, POWDER, LYOPHILIZED, FOR SOLUTION INTRAVENOUS at 06:50

## 2024-05-18 RX ADMIN — DEXTROSE AND SODIUM CHLORIDE: 5; 900 INJECTION, SOLUTION INTRAVENOUS at 09:14

## 2024-05-18 RX ADMIN — PIPERACILLIN SODIUM AND TAZOBACTAM SODIUM 3375 MG: 3; .375 INJECTION, POWDER, LYOPHILIZED, FOR SOLUTION INTRAVENOUS at 15:07

## 2024-05-18 RX ADMIN — VANCOMYCIN HYDROCHLORIDE 750 MG: 750 INJECTION, POWDER, LYOPHILIZED, FOR SOLUTION INTRAVENOUS at 09:53

## 2024-05-18 RX ADMIN — PIPERACILLIN SODIUM AND TAZOBACTAM SODIUM 3375 MG: 3; .375 INJECTION, POWDER, LYOPHILIZED, FOR SOLUTION INTRAVENOUS at 21:19

## 2024-05-18 NOTE — PLAN OF CARE
Problem: Occupational Therapy - Adult  Goal: By Discharge: Performs self-care activities at highest level of function for planned discharge setting.  See evaluation for individualized goals.  Description: FUNCTIONAL STATUS PRIOR TO ADMISSION:  Patient with hx of end stage dementia, lives with her daughter who assists with all ADLs/IADLs and mobility with no AD use. Daughter has been assisting her briefly ambulate and navigate the stairs, however significant functional decline recently with increased oral pocketing, assist for all functional tasks, and worsening cognition.     Occupational Therapy Goals:  Initiated 5/18/2024  1.  Patient will perform self-feeding with Moderate Assist within 7 day(s).  2.  Patient will perform seated grooming with Moderate Assist within 7 day(s).  3.  Patient will perform upper body dressing with Moderate Assist within 7 day(s).  4.  Patient will perform toilet transfers to/from [facility] with Maximal Assist within 7 day(s).  5.  Patient will perform all aspects of toileting with Maximal Assist within 7 day(s).  6.  Patient will participate in upper extremity therapeutic exercise/activities with Moderate Assist for 5 minutes within 7 day(s).    7.  Patient will utilize energy conservation techniques during functional activities with verbal, visual, and tactile cues within 7 day(s).    Outcome: Progressing     OCCUPATIONAL THERAPY EVALUATION    Patient: Lina Durant (89 y.o. female)  Date: 5/18/2024  Primary Diagnosis: Hypothermia, initial encounter [T68.XXXA]  Hypotension, unspecified hypotension type [I95.9]  Altered mental status, unspecified altered mental status type [R41.82]         Precautions: Fall Risk                  ASSESSMENT :  The patient is limited by decreased functional mobility, independence in ADLs, ROM, strength, body mechanics, activity tolerance, endurance, cognition (orientation, memory, safety awareness, command following, attention/concentration, sequencing,  Intervention(s):   nursing notified, rest, and repositioning    Activity Tolerance:   Good    After treatment:   Patient left in no apparent distress sitting up in chair, Call bell within reach, Bed/ chair alarm activated, and Caregiver / family present    COMMUNICATION/EDUCATION:   The patient's plan of care was discussed with: physical therapist and registered nurse    Patient Education  Education Given To: Patient;Family  Education Provided: Role of Therapy;Plan of Care;Precautions;ADL Adaptive Strategies;Transfer Training;Orientation;Equipment;Family Education;Fall Prevention Strategies;Mobility Training  Education Method: Demonstration;Verbal;Teach Back  Barriers to Learning: Cognition;Hearing  Education Outcome: Continued education needed    Thank you for this referral.  Nichol Avendaño, OTD, OTR/L  Minutes: 38    Occupational Therapy Evaluation Charge Determination   History Examination Decision-Making   MEDIUM Complexity : Expanded review of history including physical, cognitive and psychocial history  HIGH Complexity: 5 Performance deficits relating to physical, cognitive, or psychosocial skills that result in activity limitations and/or participation restrictions  HIGH Complexity: Patient presents with comorbidities that affect occupational performance.  Significant modifications of tasks or assistance (eg. physical or verbal) with assessment (s) is necessary to enable pt to complete evaluation   Based on the above components, the patient evaluation is determined to be of the following complexity level: Medium

## 2024-05-18 NOTE — PROGRESS NOTES
Pharmacist Note - Vancomycin Dosing  Therapy day 3  Indication: hypothermia  Current regimen: 1250 mg load on 5/16    Recent Labs     05/16/24  1310 05/17/24  0457 05/18/24  0549   WBC 3.2* 3.5*  --    CREATININE 0.87 1.09* 1.16*   BUN 23* 21* 18       A random vancomycin level of 9.7 mcg/mL was obtained ~37 hours from previous dose given.    Goal target range Trough 10-15 mcg/mL      Plan: Change to 750 mg IV x 1 and continue to dose by levels . Pharmacy will continue to monitor this patient daily for changes in clinical status and renal function.

## 2024-05-18 NOTE — ACP (ADVANCE CARE PLANNING)
Advance Care Planning     Advance Care Planning (ACP) Physician/NP/PA Conversation    Date of Conversation: 5/16/2024  Conducted with: Legal next of kin  Other persons present: Daughter (POA)    Healthcare Decision Maker:   Primary Decision Maker: Milka Wetzel - [place] - [phone]  Click here to complete Healthcare Decision Makers including selection of the Healthcare Decision Maker Relationship (ie \"Primary\").     Care Preferences:    Hospitalization:  \"If your health worsens and it becomes clear that your chance of recovery is unlikely, what would be your preference regarding hospitalization?\"  I, Celine Simental PA-C, made a recommendation, and the patient's daughter (POA) agrees to a DNR/DNI for the patient, but wants all medical management continued until conversation with hospice occurs.    Ventilation:  \"If you were unable to breath on your own and your chance of recovery was unlikely, what would be your preference about the use of a ventilator (breathing machine) if it was available to you?\"  The patient would NOT desire the use of a ventilator.    Resuscitation:  \"In the event your heart stopped as a result of an underlying serious health condition, would you want attempts made to restart your heart, or would you prefer a natural death?\"  No, do NOT attempt to resuscitate.    treatment goals, resuscitation preferences, end of life care preferences (vegetative state/imminent death), and hospice care    Conversation Outcomes / Follow-Up Plan:  ACP complete - no further action today  Reviewed DNR/DNI and patient elects DNR order - referred to ACP Clinical Specialist & placed order    Length of Voluntary ACP Conversation in minutes:  30 minutes    Celine Simental PA-C

## 2024-05-18 NOTE — PROGRESS NOTES
Occupational Therapy  05/18/24    Orders received, chart reviewed and patient evaluated by occupational therapy. Pending progression with skilled acute occupational therapy, recommend:  SNF vs hospice?  If d/c home would require 2 assist and extensive DME with [facility]OT    Recommend with nursing patient to complete as able in order to maintain strength, endurance and independence: OOB to chair 3x/day via stand pivot vs Christina Stedy with 2 assist, ADLs with assist, and mobilizing to the bedpan vs BSC for toileting with 2 assist. Thank you for your assistance.     Full evaluation to follow.     Thank you,   FREDDY Mcelroy, OTR/L

## 2024-05-18 NOTE — CARE COORDINATION
CM received notification that  Hospice Agency unable to accept.  Per previous CM documentation, daughter has no preference in Hospice agency. CM to send Hospice agency referrals via [employer].    CM attempted to contact daughter, Kianna Wetzel at phone# [phone] to discuss ongoing discharge planning and preferences at this time.  No answer, left VM.    CM to continue to assist with discharge needs.    Blanca Smith RN-MSN  Care Management

## 2024-05-18 NOTE — PROGRESS NOTES
[facility] Adult  Hospitalist Group                                                                                          Hospitalist Progress Note  Celine Simental PA-C  Answering service: [phone] OR [phone] from in house phone        Date of Service:  2024  NAME:  Lina Durant  :  10/5/1933  MRN:  [id number]       Admission Summary:   Lina Durant is a 89 y.o. female who presented to the ED on  with concern of altered mental status.  Patient presented to the emergency room accompanied by her daughter. Daughter stated the patient had been lethargic and having more weakness and needing assistance with her ambulation.  Patient has advanced dementia and daughter takes care of the patient at home.  According to the daughter, patient has been falling intermittently since September of last year, however patient maintaining some independence with ADLs.  Over the last few days, patient with significantly poor p.o. intake, talking less, also needing to assist with feeding which is new.  Daughter also reports that patient may be having headache and abdominal pain for past few days.       ED Course: Patient presented to the emergency room and found to be hypothermic with temperature of 90.9. Labs are fairly unremarkable and no other indicators of sepsis. Noted with elevated lactic acid level.  CT head was negative for acute pathology, UA was unremarkable, chest x-ray demonstrated reticulation at the lung bases suggesting fibrosis and cardiomegaly. CT abdomen and pelvis showed distal constipation with rectal distention, severe atherosclerosis of the aorta, and cardiomegaly with no source of infection identified.  Patient was started on broad-spectrum antibiotics with vancomycin and Zosyn, blood cultures were obtained and hospitalist service requested to admit the patient for further evaluation management.    Rapid response was called on patient at 1200 on  for increased lethargy and altered  mental status. Per the daughter, patient had not been able to open her eyes or respond as she \"normally\" does. Upon evaluating the patient, condition/mental status was unchanged from earlier in the morning that day. Patient was lethargic and was difficult to arouse. Consider continuation of end stage dementia as opposed to acute mental status change. ABG and MRI brain was ordered.     Interval history / Subjective:   Upon seeing the patient on rounds this morning, patient was comfortably sleeping in bed. Patient did not respond to verbal stimuli or sternal rub. Per overnight nurse, patient retained 309mL of urine overnight visible on bladder scan and had a distended bladder. Due to patient's small frame and stature and with efforts to continue as much comfort as possible, straight cath was performed. Overnight nurse stated patient would say \"yes\" and \"no\" but would not make any sense and did not have any coherent speech.     Upon seeing the patient later in the morning, the daughter (POA) was at bedside. I had a lengthy discussion with daughter about patient's care moving forward. Daughter confirmed patient is now a DNR/DNI and was looking forward to speaking with hospice about options to bring the patient home. Nurse and tech present in the room when DNR/DNI status was confirmed. Daughter would like to continue medical intervention until hospice conversation occurs.  ACP was completed.     Assessment & Plan:      Hypothermia (Resolved)  -Concerning for infectious process versus other etiology  -Patient with presenting temperature of 90.9  -Empiric treatment for presumed infection, continue with vancomycin and Zosyn day #3. Consider d/c abx tomorrow if no growth on cultures.  -CT Abdomen and CXR unremarkable  -Blood cultures: no growth 1 day     Acute metabolic encephalopathy  Hx End Stage Dementia  -Likely due to suspected infectious process  -CT head negative for acute pathology  -Manage underlying

## 2024-05-18 NOTE — PLAN OF CARE
Problem: Physical Therapy - Adult  Goal: By Discharge: Performs mobility at highest level of function for planned discharge setting.  See evaluation for individualized goals.  Description: FUNCTIONAL STATUS PRIOR TO ADMISSION: History obtained from patient's daughter who is her primary caregiver as patient is disoriented x 4 and per chart has end-stage dementia. Patient's daughter reports she assists patient with all mobility and self-care. Reports patient's functional level waxes and wanes but on her best days, she is able to ambulate short distances in her room while holding onto caregivers arms, however patient's daughter reports patient has had frequent falls with this task including the patient's daughter falling with her. Patient's daughter reports current L knee meniscal tear from recent fall while assisting her mother. No additional support in area.     HOME SUPPORT PRIOR TO ADMISSION: The patient lived with daughter and required maximum assistance for all self-care/ ADLs and mobility .    Physical Therapy Goals  Initiated 5/18/2024  1.  Patient will move from supine to sit and sit to supine in bed with moderate assistance within 7 day(s).    2.  Patient will sit unsupported at EOB for 2 minutes within 7 day(s).   3.  Patient will perform sit to stand with moderate assistance within 7 day(s).  4.  Patient will transfer from bed to chair and chair to bed with moderate assistance using the least restrictive device within 7 day(s).  5.  Patient's daughter will demonstrate safe placement of use of gait belt for transfers with the patient within 7 day(s).  Outcome: Progressing  PHYSICAL THERAPY EVALUATION    Patient: Lina Durant (89 y.o. female)  Date: 5/18/2024  Primary Diagnosis: Hypothermia, initial encounter [T68.XXXA]  Hypotension, unspecified hypotension type [I95.9]  Altered mental status, unspecified altered mental status type [R41.82]       Precautions: Restrictions/Precautions: Fall Risk

## 2024-05-19 LAB
ANION GAP SERPL CALC-SCNC: 4 MMOL/L (ref 5–15)
BASOPHILS # BLD: 0 K/UL (ref 0–0.1)
BASOPHILS NFR BLD: 0 % (ref 0–1)
BUN SERPL-MCNC: 16 MG/DL (ref 6–20)
BUN/CREAT SERPL: 15 (ref 12–20)
CALCIUM SERPL-MCNC: 9.2 MG/DL (ref 8.5–10.1)
CHLORIDE SERPL-SCNC: 119 MMOL/L (ref 97–108)
CO2 SERPL-SCNC: 24 MMOL/L (ref 21–32)
CREAT SERPL-MCNC: 1.1 MG/DL (ref 0.55–1.02)
DIFFERENTIAL METHOD BLD: ABNORMAL
EOSINOPHIL # BLD: 0.1 K/UL (ref 0–0.4)
EOSINOPHIL NFR BLD: 2 % (ref 0–7)
ERYTHROCYTE [DISTWIDTH] IN BLOOD BY AUTOMATED COUNT: 17.4 % (ref 11.5–14.5)
GLUCOSE SERPL-MCNC: 135 MG/DL (ref 65–100)
HCT VFR BLD AUTO: 36.9 % (ref 35–47)
HGB BLD-MCNC: 11.9 G/DL (ref 11.5–16)
IMM GRANULOCYTES # BLD AUTO: 0 K/UL (ref 0–0.04)
IMM GRANULOCYTES NFR BLD AUTO: 0 % (ref 0–0.5)
LYMPHOCYTES # BLD: 1.2 K/UL (ref 0.8–3.5)
LYMPHOCYTES NFR BLD: 25 % (ref 12–49)
MCH RBC QN AUTO: 28.4 PG (ref 26–34)
MCHC RBC AUTO-ENTMCNC: 32.2 G/DL (ref 30–36.5)
MCV RBC AUTO: 88.1 FL (ref 80–99)
MONOCYTES # BLD: 0.7 K/UL (ref 0–1)
MONOCYTES NFR BLD: 14 % (ref 5–13)
NEUTS SEG # BLD: 2.9 K/UL (ref 1.8–8)
NEUTS SEG NFR BLD: 59 % (ref 32–75)
NRBC # BLD: 0.02 K/UL (ref 0–0.01)
NRBC BLD-RTO: 0.4 PER 100 WBC
PLATELET # BLD AUTO: 53 K/UL (ref 150–400)
POTASSIUM SERPL-SCNC: 3.6 MMOL/L (ref 3.5–5.1)
RBC # BLD AUTO: 4.19 M/UL (ref 3.8–5.2)
RBC MORPH BLD: ABNORMAL
SODIUM SERPL-SCNC: 147 MMOL/L (ref 136–145)
WBC # BLD AUTO: 4.9 K/UL (ref 3.6–11)

## 2024-05-19 PROCEDURE — 2580000003 HC RX 258: Performed by: FAMILY MEDICINE

## 2024-05-19 PROCEDURE — 85025 COMPLETE CBC W/AUTO DIFF WBC: CPT

## 2024-05-19 PROCEDURE — 80048 BASIC METABOLIC PNL TOTAL CA: CPT

## 2024-05-19 PROCEDURE — 2580000003 HC RX 258: Performed by: PHYSICIAN ASSISTANT

## 2024-05-19 PROCEDURE — 1100000000 HC RM PRIVATE

## 2024-05-19 PROCEDURE — 94760 N-INVAS EAR/PLS OXIMETRY 1: CPT

## 2024-05-19 PROCEDURE — 6360000002 HC RX W HCPCS: Performed by: FAMILY MEDICINE

## 2024-05-19 PROCEDURE — 36415 COLL VENOUS BLD VENIPUNCTURE: CPT

## 2024-05-19 RX ORDER — DEXTROSE MONOHYDRATE 50 MG/ML
INJECTION, SOLUTION INTRAVENOUS CONTINUOUS
Status: DISCONTINUED | OUTPATIENT
Start: 2024-05-19 | End: 2024-05-20 | Stop reason: HOSPADM

## 2024-05-19 RX ORDER — DEXTROSE MONOHYDRATE AND SODIUM CHLORIDE 5; .45 G/100ML; G/100ML
INJECTION, SOLUTION INTRAVENOUS CONTINUOUS
Status: DISCONTINUED | OUTPATIENT
Start: 2024-05-19 | End: 2024-05-19

## 2024-05-19 RX ADMIN — SODIUM CHLORIDE, PRESERVATIVE FREE 10 ML: 5 INJECTION INTRAVENOUS at 09:00

## 2024-05-19 RX ADMIN — DEXTROSE MONOHYDRATE: 50 INJECTION, SOLUTION INTRAVENOUS at 12:33

## 2024-05-19 RX ADMIN — PIPERACILLIN SODIUM AND TAZOBACTAM SODIUM 3375 MG: 3; .375 INJECTION, POWDER, LYOPHILIZED, FOR SOLUTION INTRAVENOUS at 05:56

## 2024-05-19 RX ADMIN — PIPERACILLIN SODIUM AND TAZOBACTAM SODIUM 3375 MG: 3; .375 INJECTION, POWDER, LYOPHILIZED, FOR SOLUTION INTRAVENOUS at 22:11

## 2024-05-19 RX ADMIN — PIPERACILLIN SODIUM AND TAZOBACTAM SODIUM 3375 MG: 3; .375 INJECTION, POWDER, LYOPHILIZED, FOR SOLUTION INTRAVENOUS at 14:46

## 2024-05-19 NOTE — PROGRESS NOTES
[facility] Adult  Hospitalist Group                                                                                          Hospitalist Progress Note  David M Milligram, PA-C  Answering service: [phone] OR [phone] from in house phone        Date of Service:  2024  NAME:  Lina Durant  :  10/5/1933  MRN:  [id number]      Admission Summary:   Lina Durant is a 89 y.o. female who presented to the ED on  with concern of altered mental status.  Patient presented to the emergency room accompanied by her daughter. Daughter stated the patient had been lethargic and having more weakness and needing assistance with her ambulation.  Patient has advanced dementia and daughter takes care of the patient at home.  According to the daughter, patient has been falling intermittently since September of last year, however patient maintaining some independence with ADLs.  Over the last few days, patient with significantly poor p.o. intake, talking less, also needing to assist with feeding which is new.  Daughter also reports that patient may be having headache and abdominal pain for past few days.       ED Course: Patient presented to the emergency room and found to be hypothermic with temperature of 90.9. Labs are fairly unremarkable and no other indicators of sepsis. Noted with elevated lactic acid level.  CT head was negative for acute pathology, UA was unremarkable, chest x-ray demonstrated reticulation at the lung bases suggesting fibrosis and cardiomegaly. CT abdomen and pelvis showed distal constipation with rectal distention, severe atherosclerosis of the aorta, and cardiomegaly with no source of infection identified.  Patient was started on broad-spectrum antibiotics with vancomycin and Zosyn, blood cultures were obtained and hospitalist service requested to admit the patient for further evaluation management.    Rapid response was called on patient at 1200 on  for increased lethargy and altered

## 2024-05-19 NOTE — PROGRESS NOTES
Hospice Liaison Note: received call from CM requested I speak with daughter and give her some hospice information as she has not spoken with anyone yet and has a lot of questions.     This RN met w/dtr at bedside and reviewed recent hospitalization course. Pt has stopped eating and drinking, daughter recognizes this is a natural progression of her dementia. We discussed levels of hospice care to include routine, which can be done at home or facility and inpatient hospice being the highest level which requires severe symptom management.     I reviewed what hospice support would look like in the home to include nursing and nurse assistant visits, triage nursing support, equipment and medications that are covered by hospice. Dtr inquired about facility placement w/ hospice, explained that pt has medicare and medicaid so facility cost would be covered but do not think she will do well there as her prognosis is short; likely days to short weeks if continues not to eat.     Suggested daughter take her home w/hospice as her needs would be minimal and hospice provides additional support. I did explain to her that we are at capacity in pt's zip code at this time so if home is chosen, another agency would be consulted. Dtr asked about the hospice house which I explained is not a LTC but inpatient hospice facility for pt's needing symptom management. At this time pt is not symptomatic but discussed that this could change and we will continue to assess daily.     Dtr was appreciative of the information as she is making plans for the next steps and has never dealt with hospice before. Unsure if alternate agency has been consulted as dtr states she has a meeting Monday, may be referring to another d/w palliative team? which would be appropriate. Let her know we will check in daily as dispo evolves.       Thank you for the opportunity to be of service to this patient and family.         Blanca Love, RN, BSN, CHPN  Clinical  Nurse Liaison  [facility]  [phone]

## 2024-05-20 VITALS
TEMPERATURE: 97.2 F | WEIGHT: 104.6 LBS | SYSTOLIC BLOOD PRESSURE: 138 MMHG | HEIGHT: 63 IN | OXYGEN SATURATION: 100 % | DIASTOLIC BLOOD PRESSURE: 97 MMHG | HEART RATE: 60 BPM | RESPIRATION RATE: 16 BRPM | BODY MASS INDEX: 18.54 KG/M2

## 2024-05-20 PROBLEM — E46 PROTEIN CALORIE MALNUTRITION (HCC): Status: ACTIVE | Noted: 2024-05-20

## 2024-05-20 LAB
ANION GAP SERPL CALC-SCNC: 5 MMOL/L (ref 5–15)
BASOPHILS # BLD: 0 K/UL (ref 0–0.1)
BASOPHILS NFR BLD: 0 % (ref 0–1)
BUN SERPL-MCNC: 14 MG/DL (ref 6–20)
BUN/CREAT SERPL: 15 (ref 12–20)
CALCIUM SERPL-MCNC: 9.5 MG/DL (ref 8.5–10.1)
CHLORIDE SERPL-SCNC: 117 MMOL/L (ref 97–108)
CO2 SERPL-SCNC: 24 MMOL/L (ref 21–32)
CREAT SERPL-MCNC: 0.93 MG/DL (ref 0.55–1.02)
DIFFERENTIAL METHOD BLD: ABNORMAL
EOSINOPHIL # BLD: 0.1 K/UL (ref 0–0.4)
EOSINOPHIL NFR BLD: 3 % (ref 0–7)
ERYTHROCYTE [DISTWIDTH] IN BLOOD BY AUTOMATED COUNT: 17.6 % (ref 11.5–14.5)
GLUCOSE SERPL-MCNC: 97 MG/DL (ref 65–100)
HCT VFR BLD AUTO: 36.5 % (ref 35–47)
HGB BLD-MCNC: 12.4 G/DL (ref 11.5–16)
IMM GRANULOCYTES # BLD AUTO: 0 K/UL (ref 0–0.04)
IMM GRANULOCYTES NFR BLD AUTO: 0 % (ref 0–0.5)
LYMPHOCYTES # BLD: 1.2 K/UL (ref 0.8–3.5)
LYMPHOCYTES NFR BLD: 26 % (ref 12–49)
MCH RBC QN AUTO: 29 PG (ref 26–34)
MCHC RBC AUTO-ENTMCNC: 34 G/DL (ref 30–36.5)
MCV RBC AUTO: 85.3 FL (ref 80–99)
MONOCYTES # BLD: 0.6 K/UL (ref 0–1)
MONOCYTES NFR BLD: 14 % (ref 5–13)
NEUTS SEG # BLD: 2.6 K/UL (ref 1.8–8)
NEUTS SEG NFR BLD: 57 % (ref 32–75)
NRBC # BLD: 0 K/UL (ref 0–0.01)
NRBC BLD-RTO: 0 PER 100 WBC
PLATELET # BLD AUTO: 54 K/UL (ref 150–400)
PMV BLD AUTO: 12.2 FL (ref 8.9–12.9)
POTASSIUM SERPL-SCNC: 3.6 MMOL/L (ref 3.5–5.1)
RBC # BLD AUTO: 4.28 M/UL (ref 3.8–5.2)
RBC MORPH BLD: ABNORMAL
SODIUM SERPL-SCNC: 146 MMOL/L (ref 136–145)
WBC # BLD AUTO: 4.5 K/UL (ref 3.6–11)

## 2024-05-20 PROCEDURE — 80048 BASIC METABOLIC PNL TOTAL CA: CPT

## 2024-05-20 PROCEDURE — 2580000003 HC RX 258: Performed by: FAMILY MEDICINE

## 2024-05-20 PROCEDURE — 36415 COLL VENOUS BLD VENIPUNCTURE: CPT

## 2024-05-20 PROCEDURE — 85025 COMPLETE CBC W/AUTO DIFF WBC: CPT

## 2024-05-20 PROCEDURE — 6360000002 HC RX W HCPCS: Performed by: FAMILY MEDICINE

## 2024-05-20 PROCEDURE — 0656 HSPC GENERAL INPATIENT

## 2024-05-20 RX ADMIN — PIPERACILLIN SODIUM AND TAZOBACTAM SODIUM 3375 MG: 3; .375 INJECTION, POWDER, LYOPHILIZED, FOR SOLUTION INTRAVENOUS at 05:41

## 2024-05-20 NOTE — PROGRESS NOTES
Physician Progress Note      PATIENT:               CAROLINA CABRERA  [facility] #:                  [id number]  :                       10/5/1933  ADMIT DATE:       2024 11:52 AM  DISCH DATE:  RESPONDING  PROVIDER #:        David M Milligram PA-C          QUERY TEXT:    Patient admitted with AMS. Noted to have documentation of weight loss,   dietician assessment with malnutrition diagnosis in May 17 RD consult note. If   possible, please document in progress notes and discharge summary if you are   evaluating and /or treating any of the following:    The medical record reflects the following:  Risk Factors: advanced dementia    Clinical Indicators:   consult-  Malnutrition Assessment:  Malnutrition Status:  Severe malnutrition (24 1402)  Context:  Chronic Illness  Findings of the 6 clinical characteristics of malnutrition:  Energy Intake:  75% or less estimated energy requirements for 1 month or   longer  Weight Loss:  Unable to assess  Body Fat Loss:  Unable to assess  Muscle Mass Loss:  Severe muscle mass loss Temples (temporalis), Clavicles   (pectoralis & deltoids), Hand (interosseous)  Fluid Accumulation:  No significant fluid accumulation   Strength:  Not Performed  Nutrition Diagnosis:  Inadequate oral intake related to cognitive or   neurological impairment as evidenced by NPO or clear liquid status due to   medical condition  Treatment: ONS and assist with feeding once diet advanced, ST- swallow eval,   RD to follow    ASPEN Criteria:    https://aspenjournals.onlinelibrary.kc.com/doi/full/10.1177/390795635361339  5    Thank you,  Shayy Benavides RN  Clinical Documentation  [phone], or via Perfect Serve  Options provided:  -- Protein calorie malnutrition severe  -- Other - I will add my own diagnosis  -- Disagree - Not applicable / Not valid  -- Disagree - Clinically unable to determine / Unknown  -- Refer to Clinical Documentation Reviewer    PROVIDER RESPONSE TEXT:    This patient  has severe protein calorie malnutrition.    Query created by: Shayy Benavides on 5/20/2024 10:24 AM      Electronically signed by:  David M Milligram PA-C 5/20/2024 11:01 AM

## 2024-05-20 NOTE — PROGRESS NOTES
[facility]  Good Help to Those in Need  [phone]    Patient Name: Lina Durant  YOB: 1933  Age: 89 y.o.    [facility] RN Note:  Hospice met with daughter because we are now able to accept at home, daughter has decided she doesn't have room for patient at home, discussed LTC vs [facility]/ Patient appears uncomfortable, grimacing and showing non verbal signs of pain. Discussed with Dr. Kilpatrick decision made pt is GIP appropriate under CTI of BARBI Simms CM setting up transport.    Thank you for the opportunity to be of service to this patient.    NICHOLE HickmanN, RN  Clinical Nurse Liaison  [facility]  [phone]  Office: [phone]  Available on Perfect Serve

## 2024-05-20 NOTE — CARE COORDINATION
ROD:    CM scheduled transport with [facility] at 1pm to [facility].  RN aware.      [facility]  Care Management Department  Ph:[phone]

## 2024-05-20 NOTE — PLAN OF CARE
Problem: Safety - Adult  Goal: Free from fall injury  5/20/2024 1314 by Bertha Richards RN  Outcome: Progressing  5/19/2024 2334 by Tony Stewart RN  Outcome: Progressing     Problem: Discharge Planning  Goal: Discharge to home or other facility with appropriate resources  Outcome: Progressing     Problem: Pain  Goal: Verbalizes/displays adequate comfort level or baseline comfort level  5/20/2024 1314 by Bertha Richards RN  Outcome: Progressing  5/19/2024 2334 by Tony Stewart RN  Outcome: Progressing     Problem: Skin/Tissue Integrity  Goal: Absence of new skin breakdown  Description: 1.  Monitor for areas of redness and/or skin breakdown  2.  Assess vascular access sites hourly  3.  Every 4-6 hours minimum:  Change oxygen saturation probe site  4.  Every 4-6 hours:  If on nasal continuous positive airway pressure, respiratory therapy assess nares and determine need for appliance change or resting period.  5/20/2024 1314 by Bertha Richards RN  Outcome: Progressing  5/19/2024 2334 by Tony Stewart RN  Outcome: Progressing     Problem: ABCDS Injury Assessment  Goal: Absence of physical injury  Outcome: Progressing     Problem: Nutrition Deficit:  Goal: Optimize nutritional status  Outcome: Not Progressing

## 2024-05-20 NOTE — DISCHARGE SUMMARY
Discharge Summary       PATIENT ID: Lina Durant  MRN: [id number]   YOB: 1933    DATE OF ADMISSION: 5/16/2024 11:52 AM    DATE OF DISCHARGE: 5/20/2024   PRIMARY CARE PROVIDER: Hai Valera MD     ATTENDING PHYSICIAN: Claire Quiroz MD  DISCHARGING PROVIDER: David M Milligram, PA-C    To contact this individual call [phone] and ask the  to page.  If unavailable ask to be transferred the Adult Hospitalist Department.    CONSULTATIONS: IP CONSULT TO PHARMACY  IP CONSULT TO DIETITIAN  IP CONSULT TO CASE MANAGEMENT  IP CONSULT TO PALLIATIVE CARE  IP CONSULT TO CASE MANAGEMENT    PROCEDURES/SURGERIES: * No surgery found *     ADMITTING DIAGNOSES & HOSPITAL COURSE:   Lina Durant is a 89 y.o. female who presented to the ED on 5/16 with concern of altered mental status.  Patient presented to the emergency room accompanied by her daughter. Daughter stated the patient had been lethargic and having more weakness and needing assistance with her ambulation.  Patient has advanced dementia and daughter takes care of the patient at home.  According to the daughter, patient has been falling intermittently since September of last year, however patient maintaining some independence with ADLs.  Over the last few days, patient with significantly poor p.o. intake, talking less, also needing to assist with feeding which is new.  Daughter also reports that patient may be having headache and abdominal pain for past few days.       ED Course: Patient presented to the emergency room and found to be hypothermic with temperature of 90.9. Labs are fairly unremarkable and no other indicators of sepsis. Noted with elevated lactic acid level.  CT head was negative for acute pathology, UA was unremarkable, chest x-ray demonstrated reticulation at the lung bases suggesting fibrosis and cardiomegaly. CT abdomen and pelvis showed distal constipation with rectal distention, severe atherosclerosis of the aorta, and cardiomegaly

## 2024-05-20 NOTE — PROGRESS NOTES
[facility]  Good Help to Those in Need  [phone]     Patient Name: Lina Durant  YOB: 1933  Age: 89 y.o.    [facility] RN Note:    [facility] has confirmed patient home address is currently full. [facility] is unable to provide routine Hospice care at pt home address of:  [address] [address]     CM notified over the weekend to assist family with another Hospice agency that can provide services to patient in her home.    [employer] message sent to CM to alert.    Thank you for the opportunity to be of service to this patient.    Charley Blake RN, [facility]  Hospice Nurse Liaison  [phone] Mobile  [phone] Office  Available on Perfect Serve

## 2024-05-20 NOTE — DISCHARGE INSTRUCTIONS
Discharge SNF/Rehab Instructions/LTAC       PATIENT ID: Lina Durant  MRN: [id number]   YOB: 1933    DATE OF ADMISSION: 5/16/2024 11:52 AM    DATE OF DISCHARGE: 5/20/2024    PRIMARY CARE PROVIDER: Hai Valera MD       ATTENDING PHYSICIAN: Claire Quiroz MD  DISCHARGING PROVIDER: David M Milligram, PA-C     To contact this individual call [phone] and ask the  to page.   If unavailable ask to be transferred the Adult Hospitalist Department.    CONSULTATIONS: IP CONSULT TO PHARMACY  IP CONSULT TO DIETITIAN  IP CONSULT TO CASE MANAGEMENT  IP CONSULT TO PALLIATIVE CARE  IP CONSULT TO CASE MANAGEMENT    PROCEDURES/SURGERIES: * No surgery found *    ADMITTING DIAGNOSES & HOSPITAL COURSE:   Lina Durant is a 89 y.o. female who presented to the ED on 5/16 with concern of altered mental status.  Patient presented to the emergency room accompanied by her daughter. Daughter stated the patient had been lethargic and having more weakness and needing assistance with her ambulation.  Patient has advanced dementia and daughter takes care of the patient at home.  According to the daughter, patient has been falling intermittently since September of last year, however patient maintaining some independence with ADLs.  Over the last few days, patient with significantly poor p.o. intake, talking less, also needing to assist with feeding which is new.  Daughter also reports that patient may be having headache and abdominal pain for past few days.       ED Course: Patient presented to the emergency room and found to be hypothermic with temperature of 90.9. Labs are fairly unremarkable and no other indicators of sepsis. Noted with elevated lactic acid level.  CT head was negative for acute pathology, UA was unremarkable, chest x-ray demonstrated reticulation at the lung bases suggesting fibrosis and cardiomegaly. CT abdomen and pelvis showed distal constipation with rectal distention, severe atherosclerosis  weakness, bleeding. Severe pain or pain not relieved by medications.  Or, any other signs or symptoms that you may have questions about.    DISPOSITION:    Home With:   OT  PT  HH  RN       SNF/Inpatient Rehab/LTAC    Independent/assisted living   x Hospice    Other:       PATIENT CONDITION AT DISCHARGE:     Functional status   x Poor     Deconditioned     Independent      Cognition     Lucid     Forgetful    x Dementia      Catheters/lines (plus indication)    Bird     PICC     PEG    x None      Code status     Full code    x DNR      PHYSICAL EXAMINATION AT DISCHARGE:   Refer to Progress Note 5/20/2024      CHRONIC MEDICAL DIAGNOSES:        CDMP Checked:   Yes x     PROBLEM LIST Updated:  Yes x         Signed:   David M Milligram, PA-C  5/20/2024  12:25 PM

## 2024-05-20 NOTE — DISCHARGE SUMMARY
1245: transport here to take pt to hospice house. Gave EMS report. Unconnected pt from her continuous D5 as well as the purewick. Checked pt's brief and it was dry and clean.  1301: Gave report to hospice RN who will be taking care of pt.   1303: pt leaving on stretcher with transport. Pt is stable and on room air upon discharge. Not able to assess orientation due to pt's current status. Pt is able to verbalize, but responses are not appropriately matching the question. R forearm IV left in place for hospice house.

## 2024-05-21 PROBLEM — F03.B0 MODERATE DEMENTIA WITHOUT BEHAVIORAL DISTURBANCE, PSYCHOTIC DISTURBANCE, MOOD DISTURBANCE, OR ANXIETY (HCC): Status: ACTIVE | Noted: 2024-05-17

## 2024-05-21 LAB
BACTERIA SPEC CULT: NORMAL
BACTERIA SPEC CULT: NORMAL
SERVICE CMNT-IMP: NORMAL
SERVICE CMNT-IMP: NORMAL

## 2024-05-21 PROCEDURE — 0651 HSPC ROUTINE HOME CARE

## 2024-05-22 PROCEDURE — 0651 HSPC ROUTINE HOME CARE

## 2024-05-23 ENCOUNTER — HOME CARE VISIT (OUTPATIENT)
Age: 89
End: 2024-05-23
Payer: MEDICARE

## 2024-05-23 ENCOUNTER — HOME CARE VISIT (OUTPATIENT)
Facility: HOME HEALTH | Age: 89
End: 2024-05-23
Payer: MEDICARE

## 2024-05-23 PROCEDURE — G0299 HHS/HOSPICE OF RN EA 15 MIN: HCPCS

## 2024-05-23 PROCEDURE — 2500000001 HSPC NON INJECTABLE MED

## 2024-05-23 PROCEDURE — 0651 HSPC ROUTINE HOME CARE

## 2024-05-23 NOTE — HOSPICE
called and spoke to daughter, Kianna, within the first five days of admission.  introduced self and explaining the spiritual care support available. Daughter welcomed a  initial visit for late next week.  has communicated this plan to the RNCM, notifying of the missed visit and need for additional visit order.

## 2024-05-24 ENCOUNTER — HOME CARE VISIT (OUTPATIENT)
Facility: HOME HEALTH | Age: 89
End: 2024-05-24
Payer: MEDICARE

## 2024-05-24 ENCOUNTER — HOME CARE VISIT (OUTPATIENT)
Age: 89
End: 2024-05-24
Payer: MEDICARE

## 2024-05-24 VITALS — HEART RATE: 66 BPM | DIASTOLIC BLOOD PRESSURE: 86 MMHG | SYSTOLIC BLOOD PRESSURE: 155 MMHG | RESPIRATION RATE: 16 BRPM

## 2024-05-24 VITALS
OXYGEN SATURATION: 90 % | DIASTOLIC BLOOD PRESSURE: 49 MMHG | SYSTOLIC BLOOD PRESSURE: 88 MMHG | RESPIRATION RATE: 14 BRPM | HEART RATE: 52 BPM

## 2024-05-24 PROCEDURE — G0155 HHCP-SVS OF CSW,EA 15 MIN: HCPCS

## 2024-05-24 PROCEDURE — G0300 HHS/HOSPICE OF LPN EA 15 MIN: HCPCS

## 2024-05-24 PROCEDURE — 0651 HSPC ROUTINE HOME CARE

## 2024-05-24 ASSESSMENT — ENCOUNTER SYMPTOMS: BOWEL INCONTINENCE: 1

## 2024-05-24 NOTE — HOSPICE
pt just arrived from [facility] prior to writer's arrival.  pt lying in bed. resp even and unlabored, pleasantly confused.  VS WNL.  SRK has not arrived yet. reviewed meds with daughter and told her to call hospice before she medicated pt with any meds in [place] the first time.  pt has not needed pain medication while at [facility], needed lorazepam 0.5 mg x 1. skin dry and intact. brief dry. turned and repositioned pt and removed extra bedding from transport.  reminded daughter to call hospice for any needs

## 2024-05-24 NOTE — HOSPICE
LMSW made visit for support. Upon arrival, patient was lying in bed in living room with daughter present. Daughter presented additional FAP documentation. LMSW obtained copies. LMSW inquired about support needed and through guided discussion daughter requested facility placement. Daughter explained that UAI was completed by Moncho Hassan CM at the hospital but she did not have a copy. LMSW explained facility placement with hospice vs facility placement for rehab as daughter voiced desire for patient to regain strength. LMSW explained that rehab would be contingent on patient being able to participate. LMSW explained Medicaid Bed placement process and timeframe including with holiday weekend, searching beginning Tuesday. Daughter interested in [facility], [facility], and [employer] [facility] for palcement. LMSW discussed support available in home setting including HHA visit tomorrow and RN margot for Sunday. Daughter agreeable to HHA tomorrow and RN Call on Sunday. LMSW explained that JIMBO Nguyen would be able to provide education on care during visit scheduled for later today. LMSW will follow up on identified needs next week. LMSW will continue to be available to address needs that arise.

## 2024-05-24 NOTE — HOSPICE
F/u visit to admission:  On arrival patient lying in bed with eyes closed.  Daughter Kianna present during visit.  Daughter appeared to be overwhelmed with caregiving. Discussed the following with caregiver:    Hospice:  Reviewed goals of hospice.  Discussed comfort measures and support to both patient and caregiver.  Caregiver verebalized understanding that hospice is available 24 hours/day, 7 days/week to assist with any questions or concerns.    SRK:  Educated caregiver on individual SRK medications including indications and dosing.  Caregiver took written instructions of each and placed bag in refridgerator to keep in a sole location.    Home hospice vs LTC hospice in facility:  active listening provided as Kianna is sole caregiver and does not have help available to care for mother.   visit earlier today is helping with medicaid forms and assisting with selecting a facility for patient.  Respite options reviewed.  At this time caregiver does not want to keep transporting patient in and out of the home.    Repositioning patient and body mechanics:  Educated caregiver on how to change and reposition patient with one person.  Discussed elevating bed during care to keep from caregiver injury and lowering bed at end of care to prevent patient injury.  Demonstrated how to use a draw sheet for turning and pulling patient to head of bed.  Caregiver demonstrated and verbalized understanding.    Wound care:  Patient has stage 2 sacral wound.  Measured 1cm L x 0.5 cm wide.  Wound care orders given by Janene Chavarria NP:   Sacrum: change q3D/prn  clean with wound cleanser  apply traid  cover with foam    Area cleaned with wound cleanser, applied zinc and covered with 7x7 sacral foam.        Superficial closed 1cm x 1cm wound to (R) lower buttock:    - clean with wound cleanser  apply zinc/triad daily    wound cleansed with wound cleanser.  Zinc paste applied.      Reviewed schedule over next 3 days; verbalized

## 2024-05-25 ENCOUNTER — HOME CARE VISIT (OUTPATIENT)
Age: 89
End: 2024-05-25
Payer: MEDICARE

## 2024-05-25 ENCOUNTER — HOME CARE VISIT (OUTPATIENT)
Facility: HOME HEALTH | Age: 89
End: 2024-05-25
Payer: MEDICARE

## 2024-05-25 PROCEDURE — G0156 HHCP-SVS OF AIDE,EA 15 MIN: HCPCS

## 2024-05-25 PROCEDURE — 0651 HSPC ROUTINE HOME CARE

## 2024-05-25 PROCEDURE — G0299 HHS/HOSPICE OF RN EA 15 MIN: HCPCS

## 2024-05-26 VITALS
TEMPERATURE: 95.8 F | HEART RATE: 57 BPM | DIASTOLIC BLOOD PRESSURE: 87 MMHG | OXYGEN SATURATION: 73 % | SYSTOLIC BLOOD PRESSURE: 125 MMHG | RESPIRATION RATE: 11 BRPM

## 2024-05-26 PROCEDURE — 0651 HSPC ROUTINE HOME CARE

## 2024-05-27 ENCOUNTER — HOME CARE VISIT (OUTPATIENT)
Facility: HOME HEALTH | Age: 89
End: 2024-05-27
Payer: MEDICARE

## 2024-05-27 VITALS
SYSTOLIC BLOOD PRESSURE: 142 MMHG | RESPIRATION RATE: 16 BRPM | OXYGEN SATURATION: 96 % | DIASTOLIC BLOOD PRESSURE: 78 MMHG | HEART RATE: 65 BPM

## 2024-05-27 PROCEDURE — 0651 HSPC ROUTINE HOME CARE

## 2024-05-27 PROCEDURE — G0300 HHS/HOSPICE OF LPN EA 15 MIN: HCPCS

## 2024-05-27 PROCEDURE — G0156 HHCP-SVS OF AIDE,EA 15 MIN: HCPCS

## 2024-05-27 ASSESSMENT — ENCOUNTER SYMPTOMS: BOWEL INCONTINENCE: 1

## 2024-05-27 NOTE — HOSPICE
0-7.  On arrival patient sitting up in bed alert and feeding herself a blueberry muffin; she appeared comfortable. Daughter Kianna present during visit and stated that patient had been wearing supplemental oxygen continuous for comfort.  She reported patient being more alert today.   Gauze wrap changed to bilateral feet.  (R) foot with intact water blisters to heel and instep of foot.  (L) foot with intact blister to outer aspect of foot.  Caregiver declined need for incontinence care or repositioning.    Daughter has medical appointments in future that she needs to attend.  She is unsure of care of patient during these appointments. Suggested use of volunteer services; daughter reluctant to use service.  Will email ELLIE Adamson, who is familiar with caregiver to see if she can reach out to caregiver for further discussion.

## 2024-05-28 ENCOUNTER — HOME CARE VISIT (OUTPATIENT)
Age: 89
End: 2024-05-28
Payer: MEDICARE

## 2024-05-28 VITALS
RESPIRATION RATE: 18 BRPM | DIASTOLIC BLOOD PRESSURE: 63 MMHG | TEMPERATURE: 97.1 F | HEART RATE: 67 BPM | SYSTOLIC BLOOD PRESSURE: 103 MMHG

## 2024-05-28 PROCEDURE — 0651 HSPC ROUTINE HOME CARE

## 2024-05-28 PROCEDURE — G0299 HHS/HOSPICE OF RN EA 15 MIN: HCPCS

## 2024-05-28 RX ADMIN — Medication 10 MG: at 14:30

## 2024-05-28 ASSESSMENT — ENCOUNTER SYMPTOMS: BOWEL INCONTINENCE: 1

## 2024-05-28 NOTE — HOSPICE
Arrived at patients home, greeted by daughter. Patient is lying in bed awake in living room. She is smiling and saying \"hello\". Daughter states she is still sleeping most of the day but is doing a little better than she was a few days ago. Stated the patient did eat breakfast this morning. Heart rate is regular, no edema present, pedal pulses noted. Lung sounds are diminished but clear, even and unlabored. Bowel sounds are active in all quadrants, last BM was 5/25. Per daughters request rectal bisacodyl was given at visit since it has been 3 days since last BM. Administered by this nurse. Wound care provided to sacrum. see wound care addendum. Removed from daily visits but will return with A on Friday for wound care. Reminded daughter to call hospice with any questions, concerns or change in status.

## 2024-05-29 ENCOUNTER — HOME CARE VISIT (OUTPATIENT)
Facility: HOME HEALTH | Age: 89
End: 2024-05-29
Payer: MEDICARE

## 2024-05-29 ENCOUNTER — HOME CARE VISIT (OUTPATIENT)
Age: 89
End: 2024-05-29
Payer: MEDICARE

## 2024-05-29 PROCEDURE — G0156 HHCP-SVS OF AIDE,EA 15 MIN: HCPCS

## 2024-05-29 PROCEDURE — 0651 HSPC ROUTINE HOME CARE

## 2024-05-30 ENCOUNTER — HOME CARE VISIT (OUTPATIENT)
Age: 89
End: 2024-05-30
Payer: MEDICARE

## 2024-05-30 PROCEDURE — 0651 HSPC ROUTINE HOME CARE

## 2024-05-30 NOTE — HOSPICE
This  provided an initial spiritual care visit to the patient and daughter at her daughters home. The patient presented in her hospital bed in the living room. She was awake and alert to self. She was able to engage with  through smiles, simple responses and prayer.  provided active listening , hospice education and caregiver support to the patient's daughter.  will continue to offer spiritual care visits to this Worship patient for spiritual comfort, peace, comfort, and miguel.

## 2024-05-31 ENCOUNTER — HOME CARE VISIT (OUTPATIENT)
Age: 89
End: 2024-05-31
Payer: MEDICARE

## 2024-05-31 ENCOUNTER — HOME CARE VISIT (OUTPATIENT)
Facility: HOME HEALTH | Age: 89
End: 2024-05-31
Payer: MEDICARE

## 2024-05-31 VITALS
TEMPERATURE: 97.1 F | DIASTOLIC BLOOD PRESSURE: 76 MMHG | RESPIRATION RATE: 18 BRPM | SYSTOLIC BLOOD PRESSURE: 147 MMHG | HEART RATE: 57 BPM | OXYGEN SATURATION: 98 %

## 2024-05-31 PROCEDURE — G0156 HHCP-SVS OF AIDE,EA 15 MIN: HCPCS

## 2024-05-31 PROCEDURE — G0299 HHS/HOSPICE OF RN EA 15 MIN: HCPCS

## 2024-05-31 PROCEDURE — 0651 HSPC ROUTINE HOME CARE

## 2024-05-31 NOTE — HOSPICE
Arrived at daughters home, greeted by Milka. Patient is lying in the bed in the living room. JODI Ball present for joint visit. Patient is alert and oriented to self. She was very verbal but words were not making sense at times and others were not appropriate to the conversation. She was in good spirits. Wound care performed during the visit, see wound care addendum. Daughter stated patient had a BM after suppository was given on Tuesday at visit, had one Tuesday evening and another on Wednesay.     Patient to be moving to the [facility] on Monday at 11am. Will meet patient there for Tuck in visit. Reminded daughter to call hospice with any questions, concerns or change in status.

## 2024-06-01 PROCEDURE — 0651 HSPC ROUTINE HOME CARE

## 2024-06-02 PROCEDURE — 0651 HSPC ROUTINE HOME CARE

## 2024-06-03 ENCOUNTER — HOME CARE VISIT (OUTPATIENT)
Age: 89
End: 2024-06-03
Payer: MEDICARE

## 2024-06-03 ENCOUNTER — HOME CARE VISIT (OUTPATIENT)
Facility: HOME HEALTH | Age: 89
End: 2024-06-03
Payer: MEDICARE

## 2024-06-03 VITALS
RESPIRATION RATE: 28 BRPM | DIASTOLIC BLOOD PRESSURE: 85 MMHG | OXYGEN SATURATION: 100 % | HEART RATE: 68 BPM | SYSTOLIC BLOOD PRESSURE: 123 MMHG | TEMPERATURE: 97.3 F

## 2024-06-03 PROCEDURE — G0155 HHCP-SVS OF CSW,EA 15 MIN: HCPCS

## 2024-06-03 PROCEDURE — G0156 HHCP-SVS OF AIDE,EA 15 MIN: HCPCS

## 2024-06-03 PROCEDURE — G0299 HHS/HOSPICE OF RN EA 15 MIN: HCPCS

## 2024-06-03 ASSESSMENT — ENCOUNTER SYMPTOMS: BOWEL INCONTINENCE: 1

## 2024-06-03 NOTE — HOSPICE
Pt is met at facility with facility staff present. Dtr Kianna is coming in and out with pt items. Pt alert to person, verbal, and pleasantly confused.  She is very excited to hear that lunch will be arriving soon. Pt makes no complains of pain and does not demonstrate any non-verbal s/sx of pain.  She does not appear to be SOB and no increased WOB noted.  Wound care completed with JIMBO Iraheta Unit director and JIMBO Alejo assumes care.    Physical assessment completed, vital signs collected, see systems review. Questions answered.  They are left in no acute distress with questions answered and reminder to call hospice with any problems, questions, concerns, new, worse or different symptoms. Verbalized understanding.    Wound Care:  Completed by this RN this visit  Supplies: Reviewed - chux, triad, foam dressings, shower gel and foam, wipes, briefs, lotions, foam heel protectors Order # [id number] & [id number]  Meds: Ordered via [employer] through [facility] - liquid controlled substances ordered in 10 prefilled syringes per facility request - all SRK meds excluding Haloperidol per facility request Order #  [id number]  DME: Requested air mattress only, no frame- Order # [id number]

## 2024-06-04 ENCOUNTER — HOME CARE VISIT (OUTPATIENT)
Facility: HOME HEALTH | Age: 89
End: 2024-06-04
Payer: MEDICARE

## 2024-06-04 ENCOUNTER — HOME CARE VISIT (OUTPATIENT)
Age: 89
End: 2024-06-04
Payer: MEDICARE

## 2024-06-04 VITALS
SYSTOLIC BLOOD PRESSURE: 128 MMHG | RESPIRATION RATE: 18 BRPM | TEMPERATURE: 97.8 F | DIASTOLIC BLOOD PRESSURE: 86 MMHG | HEART RATE: 66 BPM

## 2024-06-04 PROCEDURE — G0299 HHS/HOSPICE OF RN EA 15 MIN: HCPCS

## 2024-06-04 ASSESSMENT — ENCOUNTER SYMPTOMS: BOWEL INCONTINENCE: 1

## 2024-06-04 NOTE — HOSPICE
Arrived at facility, patient in her room laying in bed. Daughter Milka present during visit as well. Patient is awake and oriented to self, very pleasant smiling and laughing. Lung sounds are clear, resp even and unlabored. Bowel sounds are active, abdomen is soft and nontender. Skin is normal for race, warm to touch. See wound care addendum. Appetite continues to vary. Confirmed incontience supplies ordered yesterday during tuck-in visit. Additional DME - wheel chair and posey overlay to be ordered. Patient had a fall out of bed yesterday evening. No injuries. No brusing or marks noted during visit. Comfort meds arrived to facility and on the cart, confirmed with Mia VIVEROS LPN. Reminded daughter and facility to call hospice with any questions, concerns or change in condition. Verbalized understanding.

## 2024-06-05 ENCOUNTER — HOME CARE VISIT (OUTPATIENT)
Facility: HOME HEALTH | Age: 89
End: 2024-06-05
Payer: MEDICARE

## 2024-06-05 PROCEDURE — G0299 HHS/HOSPICE OF RN EA 15 MIN: HCPCS

## 2024-06-05 PROCEDURE — G0155 HHCP-SVS OF CSW,EA 15 MIN: HCPCS

## 2024-06-05 PROCEDURE — G0156 HHCP-SVS OF AIDE,EA 15 MIN: HCPCS

## 2024-06-05 NOTE — HOSPICE
made PRN visit to see patient. Daughter was present. We spoke of how Sidney Gramajo has changed over the years and the patient's longevity. Caring for her mother has been difficult as she is an only child.  offered pastoral conversation and prayer.

## 2024-06-06 VITALS
DIASTOLIC BLOOD PRESSURE: 75 MMHG | HEART RATE: 70 BPM | RESPIRATION RATE: 16 BRPM | SYSTOLIC BLOOD PRESSURE: 124 MMHG | TEMPERATURE: 97.1 F

## 2024-06-06 NOTE — HOSPICE
Follow up visit due to fall out of bed last night, 6/4 @ 2340.     Patient lying awake in bed watching tv upon arrival. Med tech Mia in room as well. Stated patient had a shower this morning, ate breakfast with no signs of any distress. Pt appeared well, no bumps, bruises or marks noted. Patient is smiling, laughing and responding to questions asked. Pupils equal and reactive. Confirmed that posey mattress is getting delivered today. Fall mat is in place next to her bed. Several pillows are place on either side of patient to help prevent any further falls till the overlay arrives. Reminded staff to call hospice with any questions, concerns, or change in status.

## 2024-06-07 ENCOUNTER — HOME CARE VISIT (OUTPATIENT)
Facility: HOME HEALTH | Age: 89
End: 2024-06-07
Payer: MEDICARE

## 2024-06-07 VITALS
SYSTOLIC BLOOD PRESSURE: 135 MMHG | DIASTOLIC BLOOD PRESSURE: 86 MMHG | HEART RATE: 66 BPM | TEMPERATURE: 97 F | RESPIRATION RATE: 18 BRPM

## 2024-06-07 PROCEDURE — G0299 HHS/HOSPICE OF RN EA 15 MIN: HCPCS

## 2024-06-07 NOTE — HOSPICE
Patient was in her room awake in bed upon arrival. Alert and oriented to self, smiling and laughing. Wound care performed at this visit, see wound care addendum. No supplies needed at this time. Reminded caregivers and daughter to call hospice with any questions, concerns or change in status.

## 2024-06-07 NOTE — HOSPICE
LMSW made visit for socialization and support. Upon arrival, patient was seated in wheelchair near her bed with daughter present. Daughter shared her concerns about patient's recent falls and inquired about bed rails. LMSW went to speak with DON and facilitated communication between RN Radha and TRU who discussed bed rails are only allowed for turn/positioning needs and not fall prevention. LMSW returned to patient's room to inform daughter with RN via phone. Daughter inquired about other options. LMSW offered to ask floor staff if additional rounding can be done on patient due to fall risks overnight. Daughter agreeable. Daughter processed feelings about patient's transition to new care setting and shared concerns about isolation. LMSW offered volunteer. Daughter agreeable. LMSW provided supportive counseling and reassurance to daughter. LMSW provided socialization to patient who easily engaged. LMSW will submit volunteer request and remain available to address needs that arise.

## 2024-06-08 ENCOUNTER — HOME CARE VISIT (OUTPATIENT)
Age: 89
End: 2024-06-08
Payer: MEDICARE

## 2024-06-08 VITALS
TEMPERATURE: 95.4 F | DIASTOLIC BLOOD PRESSURE: 86 MMHG | SYSTOLIC BLOOD PRESSURE: 128 MMHG | HEART RATE: 61 BPM | RESPIRATION RATE: 17 BRPM

## 2024-06-08 PROCEDURE — G0299 HHS/HOSPICE OF RN EA 15 MIN: HCPCS

## 2024-06-10 ENCOUNTER — HOME CARE VISIT (OUTPATIENT)
Facility: HOME HEALTH | Age: 89
End: 2024-06-10
Payer: MEDICARE

## 2024-06-10 VITALS
HEART RATE: 65 BPM | DIASTOLIC BLOOD PRESSURE: 88 MMHG | SYSTOLIC BLOOD PRESSURE: 138 MMHG | RESPIRATION RATE: 18 BRPM | TEMPERATURE: 97.1 F

## 2024-06-10 PROCEDURE — G0156 HHCP-SVS OF AIDE,EA 15 MIN: HCPCS

## 2024-06-10 PROCEDURE — G0299 HHS/HOSPICE OF RN EA 15 MIN: HCPCS

## 2024-06-10 ASSESSMENT — ENCOUNTER SYMPTOMS
BOWEL INCONTINENCE: 1
STOOL DESCRIPTION: FORMED

## 2024-06-10 NOTE — HOSPICE
Patient sitting in wc in room upon arrival. She is awake and oriented to self. She remains pleasantly confused. VS are WNL. Lung sounds are clear, even and unlabored. Bowel sounds are active, abd soft and non tender. Incontinent of bowel and bladder. Wound care provided during visit, see wound care addendum. Swelling noted to bilateral ankles and feet, 1+ pitting edema. Pedal pulses present.     Kianna present during visit. Conversation about possibly moving patient back to her house, and the support she would get from hospice and other resources given for additional caregivers. Said she is not making a decision yet but has been thinking about it and feeling guilty about moving her mom to the facility. Patient has had 3 falls out of bed in less than a week. No injuries sustained during falls. Posey mattress, bed in lowest position and fall mat in place to help against falls and injury. Per facility policies, unable to have bed rails or bed alarms.     Incontinent supplies ordered. Verified comfort meds are on the cart with Mia OLIVON. Reminded staff to call hospice immediately for any falls, questions, concerns or change in status. Verbalized understanding.

## 2024-06-12 ENCOUNTER — HOME CARE VISIT (OUTPATIENT)
Facility: HOME HEALTH | Age: 89
End: 2024-06-12
Payer: MEDICARE

## 2024-06-12 PROCEDURE — G0156 HHCP-SVS OF AIDE,EA 15 MIN: HCPCS

## 2024-06-14 ENCOUNTER — HOME CARE VISIT (OUTPATIENT)
Age: 89
End: 2024-06-14
Payer: MEDICARE

## 2024-06-14 VITALS
SYSTOLIC BLOOD PRESSURE: 96 MMHG | TEMPERATURE: 96.9 F | RESPIRATION RATE: 16 BRPM | HEART RATE: 69 BPM | DIASTOLIC BLOOD PRESSURE: 78 MMHG

## 2024-06-14 PROCEDURE — G0299 HHS/HOSPICE OF RN EA 15 MIN: HCPCS

## 2024-06-15 NOTE — HOSPICE
PRN SN visit made s/p fall at facility. Upon arrival, pt resting supine in bed; eyes closed. Pt woke to voice. A&Ox1. VSS. Skin intact.  Questioned pt 4x during assessment about pain. Pt denied pain each time. Palpated scalp; no signs of discomfort. Pt able to move all extremities without discomfort. Provided update to staff. Called pt's dtr Milka with update. Dtr expressed concern about numerous falls since pt arrived at facility. Also stated she visited this morning and after fall. Pt's bed was not functioning during second visit (head/foot/height of bed unable to be lowered/raised via remote). Assured pt's dtr this RN would check on bed status and request service/replacement if still malfunctioning. Returned to pt's room and verified bed functionality working correctly.

## 2024-06-17 ENCOUNTER — HOME CARE VISIT (OUTPATIENT)
Facility: HOME HEALTH | Age: 89
End: 2024-06-17
Payer: MEDICARE

## 2024-06-17 VITALS
SYSTOLIC BLOOD PRESSURE: 117 MMHG | DIASTOLIC BLOOD PRESSURE: 68 MMHG | HEART RATE: 59 BPM | TEMPERATURE: 97.1 F | RESPIRATION RATE: 16 BRPM | OXYGEN SATURATION: 99 %

## 2024-06-17 PROCEDURE — G0156 HHCP-SVS OF AIDE,EA 15 MIN: HCPCS

## 2024-06-17 PROCEDURE — G0299 HHS/HOSPICE OF RN EA 15 MIN: HCPCS

## 2024-06-17 ASSESSMENT — ENCOUNTER SYMPTOMS: BOWEL INCONTINENCE: 1

## 2024-06-17 NOTE — HOSPICE
Patient asleep in bed upon arrival to patients room. She is drowsy but still responsive to stimuli. Spoke with JIMBO Bellamy and she stated the patient stayed awake from 7am 6/14 to 1am 6/16. Patient had a fall on 6/15, no injuries. Spoke with daughter Mlika about possibly adding a medication in to help with sleep, agreeable but wanted to wait and reevaluate patient on Thursday to see if it is needed. Lung sounds are clear, even and unlabored. Bowel sounds are active, abd soft and non tender. 2+ pitting edema to BLE from ankles down. Wound care provided, see wound care addendum. VS WNL. Patient appears comfortable, no distress noted.     Spoke with Mia CHOI, no additional questions or concerns at this time. Reminded to call hospice with any questions, concerns or change in status. Verbalized understanding.

## 2024-06-18 ENCOUNTER — HOME CARE VISIT (OUTPATIENT)
Age: 89
End: 2024-06-18
Payer: MEDICARE

## 2024-06-18 NOTE — HOSPICE
This  provided a routine spiritual care visit to the patient at her facility residence. Prior to visit  spoke on the phone with patient's daughter, Kianna, providing active listening and validation of concerns related to patient's recent falls at the facility. Upon arrival,  spoke with facility staff who reported no needs or concerns and escorted  outside to where the patient was seated in a wheelchair engaged in a social activity.  provided pastoral conversation, socialization and prayer. Not oriented to time or place, the patient engaged readily with , displaying laughter, smiles and sharing about her day and reminiscing.  encountered the patient's daughter upon exiting the facility and reported on  visit.  will continue to offer spiritual care visits for patient's spiritual comfort, miguel, and sense of peace.

## 2024-06-19 ENCOUNTER — HOME CARE VISIT (OUTPATIENT)
Facility: HOME HEALTH | Age: 89
End: 2024-06-19
Payer: MEDICARE

## 2024-06-19 ENCOUNTER — HOME HEALTH/ HOSPICE FACE TO FACE (OUTPATIENT)
Age: 89
End: 2024-06-19

## 2024-06-19 PROCEDURE — G0156 HHCP-SVS OF AIDE,EA 15 MIN: HCPCS

## 2024-06-21 ENCOUNTER — HOME CARE VISIT (OUTPATIENT)
Facility: HOME HEALTH | Age: 89
End: 2024-06-21
Payer: MEDICARE

## 2024-06-21 PROCEDURE — G0155 HHCP-SVS OF CSW,EA 15 MIN: HCPCS

## 2024-06-21 NOTE — HOSPICE
LMSW made PRN visit to follow up on adjustment to facility setting. Upon arrival, patient was eating in the dining room while daughter remained in patient's room. Point of contact for visit was daughter. LMSW provided supportive counseling to daughter who processed feelings about patient's situation and adjusting to realities of facility life. LMSW provided validation of role and situation. LMSW encouraged self care. Daughter shared that she is waiting on a response from [facility] billing regarding questions she had. LMSW checked in with facility staff who reported no new needs or concerns. LMSW will continue to offer support and will remain available to address needs that arise.

## 2024-06-24 ENCOUNTER — HOME CARE VISIT (OUTPATIENT)
Facility: HOME HEALTH | Age: 89
End: 2024-06-24
Payer: MEDICARE

## 2024-06-24 VITALS
DIASTOLIC BLOOD PRESSURE: 84 MMHG | HEART RATE: 61 BPM | TEMPERATURE: 97.8 F | SYSTOLIC BLOOD PRESSURE: 128 MMHG | RESPIRATION RATE: 16 BRPM

## 2024-06-24 PROCEDURE — G0156 HHCP-SVS OF AIDE,EA 15 MIN: HCPCS

## 2024-06-24 PROCEDURE — G0299 HHS/HOSPICE OF RN EA 15 MIN: HCPCS

## 2024-06-24 ASSESSMENT — ENCOUNTER SYMPTOMS: BOWEL INCONTINENCE: 1

## 2024-06-24 NOTE — HOSPICE
Patient in dining room waiting to have coffee with other residence, wheeled patient to a sitting room across the baugh for visit. Patient is AOX1, continues to be very pleasant and smiling/laughing at every question. She appears comfortable no distress noted. Wound are provided, see wound care addendum. Lung sounds are clear, resp even and unlabored. Bowel sounds active, abd soft and non tender. 4+ pitting edema noted to BLE from ankles down. Elevated leg rests ordered from DME to help with swelling. Facility staff also stated they will have the patient have more resting time in bed but also will allow her to continue to participate in activities during the day. Kianna was updated, she expressed concerns about the swelling but was pleased with the changes made to help reduce the swelling. Reminded facility staff and Kianna to contact hospice with any questions, concerns or change in status.

## 2024-06-26 ENCOUNTER — HOME CARE VISIT (OUTPATIENT)
Facility: HOME HEALTH | Age: 89
End: 2024-06-26
Payer: MEDICARE

## 2024-06-26 VITALS
DIASTOLIC BLOOD PRESSURE: 74 MMHG | HEART RATE: 62 BPM | TEMPERATURE: 97.1 F | RESPIRATION RATE: 16 BRPM | SYSTOLIC BLOOD PRESSURE: 110 MMHG

## 2024-06-26 PROCEDURE — G0299 HHS/HOSPICE OF RN EA 15 MIN: HCPCS

## 2024-06-26 PROCEDURE — G0156 HHCP-SVS OF AIDE,EA 15 MIN: HCPCS

## 2024-06-26 NOTE — HOSPICE
Patient was up in wheelchair eating lunch upon arrival to room. She is AOX1 and very pleasant. Denies any pain. Wound care provided during visit. See wound care addendum. Noted 3+ pitting edema to BLE, improved since last visit. Elevated foot rests are installed on wheelchair. No additional concerns voice by facility staff. Kianna updated. Reminded staff and daughter to call hospice with any questions, concerns or change in status.

## 2024-07-01 ENCOUNTER — HOME CARE VISIT (OUTPATIENT)
Facility: HOME HEALTH | Age: 89
End: 2024-07-01
Payer: MEDICARE

## 2024-07-01 PROCEDURE — G0156 HHCP-SVS OF AIDE,EA 15 MIN: HCPCS

## 2024-07-01 PROCEDURE — G0299 HHS/HOSPICE OF RN EA 15 MIN: HCPCS

## 2024-07-02 ENCOUNTER — HOME CARE VISIT (OUTPATIENT)
Age: 89
End: 2024-07-02
Payer: MEDICARE

## 2024-07-02 VITALS
RESPIRATION RATE: 16 BRPM | SYSTOLIC BLOOD PRESSURE: 117 MMHG | TEMPERATURE: 97.2 F | HEART RATE: 56 BPM | DIASTOLIC BLOOD PRESSURE: 82 MMHG

## 2024-07-02 ASSESSMENT — ENCOUNTER SYMPTOMS: BOWEL INCONTINENCE: 1

## 2024-07-02 NOTE — HOSPICE
Upon arrival to facility, patient sitting in wheelchair at nurses station. Wheeled pt to room for visit. AOX1 to self only. Very pleasant, laughing and smiling. Stated her mom came to visit her this morning and that she was doing well. Stated she really enjoyed the visit. Patients mom has been  for several years. Lung sounds are clear, even and unlabored. Abd soft non tender, bowel sounds active. Skin warm dry. Wound care provided, see wound care addendum. Supplies needed, ordered from [employer]. No medications needed per Mia CHOI.     Ran into daughter Milka on the way out of the building. Updated her on visit. Milka expressed concerns about missing items such as clothes and glasses. Asked if I saw them last visit and couldnt remember if she was wearing them or not. Provided support and a listening ear.     Reminded family and staff to contact hospice with any quesitons, concerns or change in status.

## 2024-07-02 NOTE — HOSPICE
This  provided a routine spiritual care visit to patient at her facility residence. Facility nurse reported that the patient had been recently put into bed for a rest. The patient presented as asleep in her bed and  did not attempt to rouse her.  offered a silent prayer and called patient's daughter.  provided active listening, pastoral conversation, and caregiver support to patient's daughter over the phone.  will attempt to visit patient again during hours when the patient is awake.

## 2024-07-03 ENCOUNTER — HOME CARE VISIT (OUTPATIENT)
Facility: HOME HEALTH | Age: 89
End: 2024-07-03
Payer: MEDICARE

## 2024-07-03 PROCEDURE — G0156 HHCP-SVS OF AIDE,EA 15 MIN: HCPCS

## 2024-07-03 PROCEDURE — G0299 HHS/HOSPICE OF RN EA 15 MIN: HCPCS

## 2024-07-04 VITALS — DIASTOLIC BLOOD PRESSURE: 97 MMHG | HEART RATE: 68 BPM | SYSTOLIC BLOOD PRESSURE: 143 MMHG | RESPIRATION RATE: 16 BRPM

## 2024-07-04 NOTE — HOSPICE
pt sitting in dining room having lunch. denies pain. resp even and unlabored. 3+ pitting edema in bilateral lower extremities. spoke with Suresh Prince NP regarding elevated b/p and edema.  advised to watch bot edema and b/p.  spoke with med nurse for pt and informed writer pts bed started sparking last night so bed was unplugged and pt needs a new one ordered immediatelt. triage is ordering bed for delivery.  called daughter and gave report. reminded daughter and staff to call hospice for any needs

## 2024-07-08 ENCOUNTER — HOME CARE VISIT (OUTPATIENT)
Facility: HOME HEALTH | Age: 89
End: 2024-07-08
Payer: MEDICARE

## 2024-07-08 ENCOUNTER — HOME CARE VISIT (OUTPATIENT)
Age: 89
End: 2024-07-08
Payer: MEDICARE

## 2024-07-08 VITALS
HEART RATE: 66 BPM | RESPIRATION RATE: 18 BRPM | DIASTOLIC BLOOD PRESSURE: 86 MMHG | SYSTOLIC BLOOD PRESSURE: 135 MMHG | OXYGEN SATURATION: 97 % | TEMPERATURE: 97.7 F

## 2024-07-08 PROCEDURE — G0299 HHS/HOSPICE OF RN EA 15 MIN: HCPCS

## 2024-07-08 PROCEDURE — G0156 HHCP-SVS OF AIDE,EA 15 MIN: HCPCS

## 2024-07-08 ASSESSMENT — ENCOUNTER SYMPTOMS: BOWEL INCONTINENCE: 1

## 2024-07-08 NOTE — HOSPICE
Upon arrival to patients room at facility, she is laying in bed asleep. She is easily woken with verbal stimuli. Pt is agitated today, was uncooperative when trying to get vitals and repositioned in bed. Lung sounds are clear, even and unlabored. Abd soft non tender, bowel sounds are active. Skin warm dry. 2+ swelling noted to BLE from ankles down. Patient had fall last night without injury. Per facility staff she has been in bed since fall, has not gotten her up to her wheelchair.      Updated daughter about visit. Asked about thoughts on adding Trazodone at bedtime to help with any agitation or increased confusion at bedtime, waiting on response. Doing joint visit with Janene Chavarria NP on Thursday 7/11. Reminded staff to contact hospice with any questions, concerns or change in status.

## 2024-07-09 ENCOUNTER — HOME CARE VISIT (OUTPATIENT)
Age: 89
End: 2024-07-09
Payer: MEDICARE

## 2024-07-09 NOTE — HOSPICE
This  provided a routine spiritual care visit to patient at her facility residence.  spoke to facility nurse, Sapna, who reported on patient's recent fall and increased fatigue since then.  encountered the patient seated in her wheelchair, asleep, in an activity room during a group activity.  wheeled the patient to a quiet spot and patient woke to 's voice.  engaged the patient with simple scripture reading, gospel music, and prayer. The patient nodded in agreement to 's interventions but was very sleepy and nodded off to sleep frequently during 's visit.  called daughter following visit, reporting on 's visit and providing active listening as daughter shared concerns about patient's falls at facility.  called and spoke with hospice RN, Radha, reporting on daughter's concerns.  will continue to offer twice monthly visits to patient for spiritual connection, compassionate presence, and moments of miguel.

## 2024-07-10 ENCOUNTER — HOME CARE VISIT (OUTPATIENT)
Age: 89
End: 2024-07-10
Payer: MEDICARE

## 2024-07-10 ENCOUNTER — HOME CARE VISIT (OUTPATIENT)
Facility: HOME HEALTH | Age: 89
End: 2024-07-10
Payer: MEDICARE

## 2024-07-10 PROCEDURE — G0156 HHCP-SVS OF AIDE,EA 15 MIN: HCPCS

## 2024-07-11 ENCOUNTER — HOME HEALTH/ HOSPICE FACE TO FACE (OUTPATIENT)
Age: 89
End: 2024-07-11

## 2024-07-11 ENCOUNTER — HOME CARE VISIT (OUTPATIENT)
Facility: HOME HEALTH | Age: 89
End: 2024-07-11
Payer: MEDICARE

## 2024-07-11 VITALS
SYSTOLIC BLOOD PRESSURE: 107 MMHG | RESPIRATION RATE: 16 BRPM | OXYGEN SATURATION: 97 % | DIASTOLIC BLOOD PRESSURE: 65 MMHG | HEART RATE: 83 BPM | TEMPERATURE: 96.9 F

## 2024-07-11 PROCEDURE — G0299 HHS/HOSPICE OF RN EA 15 MIN: HCPCS

## 2024-07-11 NOTE — HOSPICE
Joint visit with Janene Chavarria NP, daughter Kianna also present during visit. Discussion about care, the decline with her mom and different options pertaining to medications to help with anxiety and trouble sleeping. Adding Escitalopram 10mg daily in mornings and offering Ensure if patient does not eat the meal. Orders written at facility.     Patient was very drowsy and difficult to arouse. Patient did get a little agitated when trying to wake and get vitals, was mumbling words and pulling her arms away. VS WNL. Lung sounds were clear, resp even and unlabored. Bowel sounds active, abd soft and nontender. 2+ pitting edema to BLE, ankles down. Wound care provided during visit. see wound care addendum. Patient did not eat breakfast. Per daughter patient has been having up and down days. Some days she is participating in activities and going to the dining baugh for meals and other days she is in bed all day asleep and doesn't want to be bothered.     Reminded daughter and staff to contact hospice with any questions, concerns or change in status.

## 2024-07-11 NOTE — HOSPICE
[facility]   Good Help to Those in Need  [phone]     Patient Name:  Lina Durant  YOB: 1933         Date of Provider Hospice Visit: 07/11/24     Level of Care:   [] General Inpatient (GIP)              [x] Routine                   [] Respite     Current Location of Care: [facility]      Date of Original Hospice Admission:  5/20/2024  Hospice Medical Director at time of admission: Dr. Kam Reyna     Principle Hospice Diagnosis:   PCM  Diagnoses RELATED to the terminal prognosis: Dementia with behaviors, anxiety, dysphagia  Other Diagnoses: Unspecified arrhythmia s/p pacemaker, HTN, HLD     HOSPICE SUMMARY   Lina Durant is a 89 yo AA female admitted to [facility] on GIP level of care at [facility] on 5/20/2024. Transitioned home on routine level of care at home on 5/23/2024. Then later moved to [facility] facility on 6/3/2024 as she required higher level of care.      HOSPICE DIAGNOSES   Active Symptoms:  1.  PCM  2.  Dementia with behaviors  3.  Anxiety  4.  Weakness/debility  5.  Recurrent falls  6.  Sleep/wake disturbances  7.  Anorexia  8.  Hospice care      PLAN   Continue routine level of care at LTC facility with SN twice weekly  Reviewed hospice philosophy and goals of care with daughter Kianna; extensive discussion regarding role of LTC facility and hospice team members; discussed current plan of care and recommendations for addressing Kianna's current questions/concerns  New order for Escitalopram 10 mg po daily for anxiety; daughter reports that she has been on this in the past and seemingly tolerated well  Reviewed other past medications; discussed daughter's current reservations about use of antipsychotic and other psychotropic medications to include Seroquel and Remeron; reviewed previous instructions and advised that \"bad reaction\" described as prolonged somnolence with inability to rouse possibly related to using medications

## 2024-07-15 ENCOUNTER — HOME CARE VISIT (OUTPATIENT)
Facility: HOME HEALTH | Age: 89
End: 2024-07-15
Payer: MEDICARE

## 2024-07-15 VITALS
TEMPERATURE: 96.9 F | SYSTOLIC BLOOD PRESSURE: 145 MMHG | HEART RATE: 64 BPM | DIASTOLIC BLOOD PRESSURE: 88 MMHG | RESPIRATION RATE: 16 BRPM

## 2024-07-15 PROCEDURE — G0299 HHS/HOSPICE OF RN EA 15 MIN: HCPCS

## 2024-07-15 PROCEDURE — G0156 HHCP-SVS OF AIDE,EA 15 MIN: HCPCS

## 2024-07-15 ASSESSMENT — ENCOUNTER SYMPTOMS: BOWEL INCONTINENCE: 1

## 2024-07-16 ENCOUNTER — HOME CARE VISIT (OUTPATIENT)
Age: 89
End: 2024-07-16
Payer: MEDICARE

## 2024-07-16 PROCEDURE — G0155 HHCP-SVS OF CSW,EA 15 MIN: HCPCS

## 2024-07-17 ENCOUNTER — HOME CARE VISIT (OUTPATIENT)
Facility: HOME HEALTH | Age: 89
End: 2024-07-17
Payer: MEDICARE

## 2024-07-17 PROCEDURE — G0156 HHCP-SVS OF AIDE,EA 15 MIN: HCPCS

## 2024-07-18 ENCOUNTER — HOME CARE VISIT (OUTPATIENT)
Facility: HOME HEALTH | Age: 89
End: 2024-07-18
Payer: MEDICARE

## 2024-07-18 PROCEDURE — G0299 HHS/HOSPICE OF RN EA 15 MIN: HCPCS

## 2024-07-19 VITALS
TEMPERATURE: 97.1 F | HEART RATE: 64 BPM | OXYGEN SATURATION: 99 % | DIASTOLIC BLOOD PRESSURE: 78 MMHG | SYSTOLIC BLOOD PRESSURE: 124 MMHG | RESPIRATION RATE: 16 BRPM

## 2024-07-19 NOTE — HOSPICE
Patient sitting up straight in bed with breakfast in front of her, was untouched. AOX1 to self only. Very pleasant and smiling. Helped feed her most of her breakfast till she was able to feed herself. VS WNL. Wound care provided, see wound care addendum. Med rec completed with iMa CHOI. No additional needs or concerns noted at visit. Kianna updated. Reminded facility to call hospice with any questions, concerns or change in status.

## 2024-07-22 ENCOUNTER — HOME CARE VISIT (OUTPATIENT)
Facility: HOME HEALTH | Age: 89
End: 2024-07-22
Payer: MEDICARE

## 2024-07-22 VITALS
DIASTOLIC BLOOD PRESSURE: 84 MMHG | HEART RATE: 67 BPM | OXYGEN SATURATION: 98 % | SYSTOLIC BLOOD PRESSURE: 127 MMHG | RESPIRATION RATE: 16 BRPM | TEMPERATURE: 96.9 F

## 2024-07-22 PROCEDURE — G0156 HHCP-SVS OF AIDE,EA 15 MIN: HCPCS

## 2024-07-22 PROCEDURE — G0299 HHS/HOSPICE OF RN EA 15 MIN: HCPCS

## 2024-07-22 ASSESSMENT — ENCOUNTER SYMPTOMS
COUGH CHARACTERISTICS: CONGESTED
COUGH: 1
BOWEL INCONTINENCE: 1

## 2024-07-22 NOTE — HOSPICE
Patient was sitting up in wheelchair, asleep in the dining room upon arrival. They were having coffee and friends activity. Patient woke with viri touch and calling her name. She smiled and closed her eyes again. VS WNL. She denied any pain and appeared very comfortable. No distress noted. Lung sounds were clear, resp even and unlabored. Skin warm and dry. Bowel sounds active, last BM yesterday. Abd soft and nontender. 2+ pitting edema to BLE from ankles down. Feet are propped up in wheelchair. Wound care provided, see wound care addendum.     Supplies were brought in by this RN due to facility loosing supplies if delivered to the facility. No meds refills needed per Mia CHOI. Reminded facility to call hospice with any questions, concerns or change in status.

## 2024-07-23 ENCOUNTER — HOME CARE VISIT (OUTPATIENT)
Age: 89
End: 2024-07-23
Payer: MEDICARE

## 2024-07-23 NOTE — HOSPICE
This  provided PRN visit to patient in order to provide two spiritual care visits this month while patient was awake.  encountered patient in her wheelchair, awake, alert to self only, seated near nurse's station.  engaged patient with compassionate presence, scripture reading, spiritual music, pastoral conversation and prayer. The patient responded to  with smiles, chuckles, short phrases, and during the music offered expressions of praise. Daughter, Kianna, arrived as  was leaving and  reported on visit and provided pastoral support.  will continue to offer spiriual support to patient for spiritual connection, companionship, and experiences of miguel.

## 2024-07-24 ENCOUNTER — HOME CARE VISIT (OUTPATIENT)
Facility: HOME HEALTH | Age: 89
End: 2024-07-24
Payer: MEDICARE

## 2024-07-24 PROCEDURE — G0156 HHCP-SVS OF AIDE,EA 15 MIN: HCPCS

## 2024-07-25 ENCOUNTER — HOME CARE VISIT (OUTPATIENT)
Facility: HOME HEALTH | Age: 89
End: 2024-07-25
Payer: MEDICARE

## 2024-07-25 VITALS
DIASTOLIC BLOOD PRESSURE: 88 MMHG | TEMPERATURE: 96.6 F | SYSTOLIC BLOOD PRESSURE: 126 MMHG | HEART RATE: 57 BPM | RESPIRATION RATE: 16 BRPM

## 2024-07-25 PROCEDURE — G0155 HHCP-SVS OF CSW,EA 15 MIN: HCPCS

## 2024-07-25 PROCEDURE — G0299 HHS/HOSPICE OF RN EA 15 MIN: HCPCS

## 2024-07-25 NOTE — HOSPICE
Joint visit with Bonita SHEA. Patient was in hallway in front of nurses station asleep upon arrival to facility. Woke easily with viri touch. Wheeled patient to her room for visit. Throughout entire visit patient was very drowsy, kept falling asleep when not engaged. Also when asked questions her answers were very subdued from previous visits. She was corporative during visit. VS WNL. Heart regular, lung sounds clear. Resp even and unlabored. Abd soft nontender. Bowel sounds present. Noted swelling to both ankles/feet - right 2+ pitting, left 4+ pitting. Both feet are propped up while in wheelchair.     Bonita played [employer] music at the end of the visit. Patient had eyes closed and would smile on occasion. Spoke with facility LPN on [employer], she stated patient was pocketing food yesterday. Encouraged them to give slower bites and help facilitate her swallowing after each bite. Will consult NP about new change and pocketing the food. Progress noted left with LPN and wheeled patient back up to the nurses station. Reminded staff to call hospice with any questions, concerns or change in status.

## 2024-07-26 NOTE — HOSPICE
LMSW and KIAN Garcia made visit for socialization. Upon arrival, patient was seated in wheelchair by nurses station. RN and LMSW assisted patient back to her room for visit. LMSW provided socialization to patient who was somnolent throughout visit but roused to voice, touch, or movement. LMSW engaged patient with gospel music. Patient able to verbalize one or two words at a time. Facility staff noted that patient has begun pocketing food but no other concerns noted. LMSW will continue to offer support and will remain available to address needs that arise.

## 2024-07-29 ENCOUNTER — HOME CARE VISIT (OUTPATIENT)
Facility: HOME HEALTH | Age: 89
End: 2024-07-29
Payer: MEDICARE

## 2024-07-29 PROCEDURE — G0156 HHCP-SVS OF AIDE,EA 15 MIN: HCPCS

## 2024-07-29 PROCEDURE — G0299 HHS/HOSPICE OF RN EA 15 MIN: HCPCS

## 2024-07-30 VITALS
SYSTOLIC BLOOD PRESSURE: 112 MMHG | DIASTOLIC BLOOD PRESSURE: 42 MMHG | RESPIRATION RATE: 16 BRPM | HEART RATE: 51 BPM | TEMPERATURE: 96.6 F

## 2024-07-30 ASSESSMENT — ENCOUNTER SYMPTOMS
BOWEL INCONTINENCE: 1
TROUBLE SWALLOWING: 1

## 2024-07-30 NOTE — HOSPICE
Patient sitting in wheelchair with legs propped at the nurses station upon arrival to facility. Patient wheeled to her room to have SNV. She is very drowsy today, fell asleep several times during visit. Her speech was incomprehensible. Was unable to follow any commands, just looked towards me but not making eye contact or would just smile. Skin is very cold to touch, temp 96.6. She was wearing long pants with socks, long sleeve shirt with a sweater. Noted left hand is contracted, difficulty in opening hand, kept resisting. Wound care provided, see wound care addendum. Lung sounds were clear but diminished in bases. Resp even and unlabored. Abd is soft and nontender. Bowel sounds active. 1+ pitting edema to BLE from calf down. Last BM was 7/28. Patient was downgraded to a pureed diet due to pocketing food.     Spoke with Myrtle Unit Manager, gave her a report on patient about visit. Reminded to call hospice with any questions, concerns or change in status. Daughter Kianna updated.

## 2024-07-31 ENCOUNTER — HOME CARE VISIT (OUTPATIENT)
Facility: HOME HEALTH | Age: 89
End: 2024-07-31
Payer: MEDICARE

## 2024-07-31 PROCEDURE — G0156 HHCP-SVS OF AIDE,EA 15 MIN: HCPCS

## 2024-08-01 ENCOUNTER — HOME CARE VISIT (OUTPATIENT)
Facility: HOME HEALTH | Age: 89
End: 2024-08-01
Payer: MEDICARE

## 2024-08-01 ENCOUNTER — HOME CARE VISIT (OUTPATIENT)
Age: 89
End: 2024-08-01
Payer: MEDICARE

## 2024-08-01 VITALS
RESPIRATION RATE: 16 BRPM | SYSTOLIC BLOOD PRESSURE: 144 MMHG | HEART RATE: 60 BPM | OXYGEN SATURATION: 95 % | DIASTOLIC BLOOD PRESSURE: 89 MMHG | TEMPERATURE: 96.6 F

## 2024-08-01 PROCEDURE — G0299 HHS/HOSPICE OF RN EA 15 MIN: HCPCS

## 2024-08-01 NOTE — HOSPICE
This  provided routine spiritual care visit to patient and daughter at patient's facility.  encountered daughter in lobby and provided active listening and validation of feelings as daughter shared complaints about the facility's care of the patient. Hospice Nurse Radha crandall and  jointly provided support to daughter as she and RN waited to have a meeting with the facility administration.  encountered the patient in the activity room, asleep in her wheelchair.  wheeled her to the hallway and wheeled her around to look at the garden, providing patient with gospel music, compassionate touch, pastoral presence, and prayer. The patient appeared to most enjoy the time spent in a quiet hallway peering out at the facility's garden and having a prayer.   will continue to offer spiritual care support to patient and daughter as patient journeys toward end of life.

## 2024-08-01 NOTE — HOSPICE
Careplan meeting with facility and daughter Kianna today.     Patient was asleep in wheelchair at the nurses station, easily woke with viri touch. Brought patient to her room to do the visit. She remains very drowsy and difficult to stay awake during the visit. She is not oriented to herself anymore. She did smile or laugh when I asked a question. VS WNL. Lung sounds were diminished and crackles noted in LLL. Resp even and unlabored. Bowel sounds active, abd sunken but soft nontender. Slight swelling to BLE, 1+ from ankles down.     During visit, it was lunch time and food was brought into her room so she was able to be fed. When caregiver tried to give her a spoonful of food, she would not open her mouth. With several prompts to open her mouth, she complied but then wouldn't shut her mouth to take the bite. Daughter Kianna then helped get the food into her mouth. Patient just kept the food at the beginning of her tongue. With multiple prompts, rubbing of her throat, she was unable to follow any of these commands to swallow. She just kept opening her mouth wide then shut it, noting the food still on her tongue when she opened it. This nurse removed the food from her mouth so she did not choke or aspirate on it. Discussion with daughter about this decline and what to expect due to her mom not being able to swallow/eat anymore. She was very tearful but understood everything that was said. Stated that they will still offer her food but if she cant swallow or follow commands they will not force her. Kianna agreed, she doesn't want her mom to be forced to eat. Updated Mia CHOI and unit manager Myrtle about the visit. They verbalized understanding.     Patient put on schedule for Saturday visit due to transitioning, increasing SNV to three times a week - [facility]. Reminded staff to contact hospice with any questions, concerns or change in status.

## 2024-08-03 ENCOUNTER — HOME CARE VISIT (OUTPATIENT)
Age: 89
End: 2024-08-03
Payer: MEDICARE

## 2024-08-03 PROCEDURE — G0299 HHS/HOSPICE OF RN EA 15 MIN: HCPCS

## 2024-08-04 VITALS
SYSTOLIC BLOOD PRESSURE: 89 MMHG | RESPIRATION RATE: 16 BRPM | TEMPERATURE: 96.6 F | HEART RATE: 54 BPM | DIASTOLIC BLOOD PRESSURE: 69 MMHG

## 2024-08-05 ENCOUNTER — HOME CARE VISIT (OUTPATIENT)
Facility: HOME HEALTH | Age: 89
End: 2024-08-05
Payer: MEDICARE

## 2024-08-05 ENCOUNTER — HOME CARE VISIT (OUTPATIENT)
Age: 89
End: 2024-08-05
Payer: MEDICARE

## 2024-08-05 VITALS
RESPIRATION RATE: 16 BRPM | TEMPERATURE: 96.4 F | HEART RATE: 43 BPM | SYSTOLIC BLOOD PRESSURE: 127 MMHG | DIASTOLIC BLOOD PRESSURE: 89 MMHG

## 2024-08-05 PROCEDURE — G0155 HHCP-SVS OF CSW,EA 15 MIN: HCPCS

## 2024-08-05 PROCEDURE — G0299 HHS/HOSPICE OF RN EA 15 MIN: HCPCS

## 2024-08-05 PROCEDURE — G0156 HHCP-SVS OF AIDE,EA 15 MIN: HCPCS

## 2024-08-05 ASSESSMENT — ENCOUNTER SYMPTOMS
BOWEL INCONTINENCE: 1
TROUBLE SWALLOWING: 1

## 2024-08-05 NOTE — HOSPICE
Upon arrival to facility, patient is asleep in wheelchair at the nurses station. Wheeled back to her room for visit. Patient is very lethargic, difficult to arouse. Opened her eyes a few times but was unable to make eye contact. She was leaning to the left side of the wheelchair, repositioned her several times and would just continue to fall to her left side. Patient mouth was open with tongue hanging out, patient was drooling. She did close her mouth and smiled periodically ruling out any neuro deficits. She is unable to follow any commands. Skin was ice cold to touch. Appetite remains poor, per Mia OLIVON, patient drank half of a nectar thick ensure this morning but no actual food. 2+ pitting edema to BLE from ankles down. She appears comfortable, in no distress.     Increasing SNV to 3x weekly. Reminded facility staff to call hospice with any questions concerns or change in status.

## 2024-08-07 ENCOUNTER — HOME CARE VISIT (OUTPATIENT)
Facility: HOME HEALTH | Age: 89
End: 2024-08-07
Payer: MEDICARE

## 2024-08-07 VITALS
DIASTOLIC BLOOD PRESSURE: 42 MMHG | HEART RATE: 64 BPM | TEMPERATURE: 96.8 F | RESPIRATION RATE: 12 BRPM | SYSTOLIC BLOOD PRESSURE: 70 MMHG

## 2024-08-07 PROCEDURE — G0299 HHS/HOSPICE OF RN EA 15 MIN: HCPCS

## 2024-08-07 PROCEDURE — G0156 HHCP-SVS OF AIDE,EA 15 MIN: HCPCS

## 2024-08-07 NOTE — HOSPICE
Arrived at patient room at the facility. Patient is lying in bed resting peacefully. Patient was not responding, even to painful stimuli. Respirations are even but very shallow, rate of 12. Lung sounds are diminished. Abd is concaved. Bowel sounds hypoactive. Skin is cool to touch. Finger nails are dusky, unable to obtain oxygen reading. No swelling noted to BLE. Per facility staff, patient has not been eating meals or drinking fluids since Monday. She has been unable to follow the commands or carry out the task of swallowing the liquids. They would just come out of her mouth.     Spoke with Kianna prior to visit, she is aware and very tearful about the decline. Placed on 0-7 daily visits. Reminded staff to call hospice with any questions or change in status.

## 2024-08-08 ENCOUNTER — HOME CARE VISIT (OUTPATIENT)
Facility: HOME HEALTH | Age: 89
End: 2024-08-08
Payer: MEDICARE

## 2024-08-08 ENCOUNTER — HOME CARE VISIT (OUTPATIENT)
Age: 89
End: 2024-08-08
Payer: MEDICARE

## 2024-08-08 VITALS
RESPIRATION RATE: 12 BRPM | DIASTOLIC BLOOD PRESSURE: 78 MMHG | TEMPERATURE: 96.8 F | SYSTOLIC BLOOD PRESSURE: 112 MMHG | HEART RATE: 54 BPM

## 2024-08-08 PROCEDURE — G0299 HHS/HOSPICE OF RN EA 15 MIN: HCPCS

## 2024-08-08 NOTE — HOSPICE
Upon arrival to facility, patient was asleep in her wheelchair at the nurses station. She was not coherent and leaning to her right side with her tongue out. Wheeled her to her room, for safety reasons I transferred her into her bed. She did open her eyes but she was unable to meaningful look at a certain object. Both arms were moving but not in any purposeful movements. She is nonverbal, moaned once during transfer. VS WNL. Very frail and sunken facial features. Skin cold to touch. Once in bed patient was having periods of apnea for 10 seconds long every 3-4 minutes. Lung sounds are diminished but clear. Hr is regular. Bowel sounds are absent. Abd is sunken and nontender. Noted the applesauce/medicine that was given to her this morning was on her bib. Patient has not drank fluid or ate any food in several days now. Remains on daily visits. No swelling noted to BLE.     Informed staff to not get the patient out of bed, she is to remain comfortable in bed. Reminded staff to call hospice with any questions, concerns or change in status.     Patient left resting in bed, bed in lowest position, fall mat layed on the floor next to her bed and call bell is within reach. Report given to Mia CHOI.

## 2024-08-09 ENCOUNTER — HOME CARE VISIT (OUTPATIENT)
Facility: HOME HEALTH | Age: 89
End: 2024-08-09
Payer: MEDICARE

## 2024-08-09 PROCEDURE — G0299 HHS/HOSPICE OF RN EA 15 MIN: HCPCS

## 2024-08-09 PROCEDURE — G0155 HHCP-SVS OF CSW,EA 15 MIN: HCPCS

## 2024-08-09 NOTE — HOSPICE
Patient is lying in bed, awake at times but not coherent. Words are nonsensical. Will periodically raise right arm upwards but unable to move her hand. They are in a fixed contracted position. She cannot make eye contact or follow commands. Lung sounds are clear, resp even and unlabored. Abd soft, nontender. Bowel sounds hypoactive in left upper quadrant and absent in rest of quadrants. Swelling to RLE, 1+ pitting. Patient has not ate food or drank liquids since Friday 8/2. Lips around mouth are move sunken in than usual and tongue is hanging out at times.     Continue on daily visits. Reminded staff to call hospice with any questions, concerns or change in status.

## 2024-08-09 NOTE — HOSPICE
LMSW made visit for socialization. Upon arrival, patient was minimally responsive responding to internal stimuli with rapid eye movement. Patient had a large bowel movement. LMSW alerted facility CNA who addressed. LMSW called and left a voicemail for daughter offering support. LMSW will continue to offer support and will remain available to address needs that arise.

## 2024-08-10 NOTE — HOSPICE
This  provided a routine spiritual care visit to patient whom RN report indicates is nearing end of life.  called and spoke to daughter prior to visit, providing support for anticipatory grief.  visited patient at bedside. Patient was resting, mouth open breathing with periods of apnea, appearing comfortable, hands cool to touch.  offered spiritual music, prayer, and pastoral presence.  reported patient's condition to hospice RN and facility nurse.  will continue to offer spiritual support to patient and daughter for spiritual comfort and grief support as patient continues to journey toward end of life.

## 2024-08-12 NOTE — HOSPICE
Patient lying in her bed in her room at the facility. She having periods where her eyes will open and arms will move around but not in a meaningful deliberate way. She appears to be talking with someone while looking upwards, words are incomprehensible. Then she would have periods of not responding to any stimuli, during these periods she is having apnea. These episodes come every 1-2 minutes, lasting from 45-50 seconds. Pt appears comfortable, no distress noted. Skin cool and dry. No swelling noted to BLE. Lung sounds are diminished, but clear. HR regular. Abd soft nontender, sunken, hypoactive bowel sounds. Remains on daily visits. Kianna updated.     Progress note left with Mia CHOI. Reminded staff to call hospice with any questions or change in status.

## 2024-08-13 NOTE — HOSPICE
Pt is met in her room at facility with dtr present.  Pt resting with eyes slightly open but is minimally responsive to any stimuli.  She does not make any attempts at communication and makes no effort against me during wound care or max assist bed mobility.  She does not appear to be in pain and she does not demonstrate any SOB or increased WOB.  Dtr reports she has had no PO intake of food or fluids in 5-6 days.  Pt is positioned to a place of comfort, dtr is very grateful for hospice services and is very realistic that pt appears to be transitioning to EOL.  Dtr is left in no acute distress although slightly tearful.  Facility staff advised of wound care orders obtained via telephone Suresh Prince NP for stage 2 sacral ulcer (which appears to have been in place since admission).  Cleanse sacrum with wound cleanser or soap and water, pat dry, apply triad paste and cover with foam dressing.  Change q3 days and PRN soiled or loose.  Written order left on progress note per Sapna CHOI as the facility does not have any paper order sheets at present.  Sapna verbalized understanding of orders and has no questions.      Physical assessment completed, vital signs collected, see systems review. Questions answered.  They are left in no acute distress with questions answered and reminder to call hospice with any problems, questions, concerns, new, worse or different symptoms. Verbalized understanding.    Wound Care:  Completed by this RN this visit    Supplies: Reviewed -  not needed - triad paste left from car stock - pt has 4x4 foam dressings and wound cleanser at bedside.  Meds: Reviewed.  Refills not needed    New orders received and verified via Telephone Order with Read back:    Suresh Prince NP

## 2024-08-13 NOTE — HOSPICE
This  provided a PRN visit to the patient at bedside at her facility. The patient's daughter was there and the patient was intermittently awake alert to self and sleep. This  provided compassionate touch, pastoral conversation, scripture, reading, and prayer to the patient's daughter and the patient. Daughter expressed gratitude to  for visits and support, expressing her grief and sharing about final arrangements.  communicated with facility nurse and hospice nurse.  will continue to offer spiritual care support to patient and her daughter as the patient continues to journey toward end of life.

## 2024-08-16 NOTE — HOSPICE
Arrived at patient room at facility, patient is in bed and appears very uncomfortable. Continuing to have periods of apnea but then breathing become very tachypenic and congested cough noted, but she is too weak to be able to clear it. Dose of hydromorphone was given by staff to help with her breathing. Patient did calm down during visit. She remains unresponsive, no meaningful movements. Skin is hot to touch, temp 100.9. Lung sounds are coarse, labored prior to medication. HR is irregular.    Daughter Kianna arrived during visit. She was very tearful but accepting of what is to come. She expressed when her mom does pass, she does not want to be present when the  home is called and when they arrive to get her mom.     Progress note left with Mia CHOI. Reminded to call hospice with any questions, concerns or change in status.

## 2024-08-17 NOTE — HOSPICE
Pronouncement of death completed by: Lou Verde RN (first/last name, title).  Agency staff was not present at the time of death.  At the time of death the patient was documented as [facility] death pronouncement apneic, pulseless, blood pressure absent and unresponsive to verbal, tactile, and or painful stimuli. The pt  within SNF(location).  The following were notified of the patient's death: MD, DME, Hospice staff.  Medications were disposed of per facility protocol

## 2024-08-17 NOTE — HOSPICE
Arrived at patients room at the facility, patient is unresponsive but appears comfortable. No response to any stimuli. Skin is hot to touch but dry. Respirations are labored at 25 but even and shallow. HR is irregular. Nail beds are dusky, no cap refill noted. Hydromorphone is being given every 3 hours per facility LPN. Will continue on daily visits. Reminded staff to call with any change of status.

## 2024-08-19 ENCOUNTER — HOME CARE VISIT (OUTPATIENT)
Age: 89
End: 2024-08-19
Payer: MEDICARE

## 2025-04-07 NOTE — HOSPICE
April 7, 2025     Patient: Arabella Pride   YOB: 2007   Date of Visit: 4/7/2025       To Whom it May Concern:    Arabella Pride was seen in my clinic on 4/7/2025. She  may return to school in one day.           Sincerely,          LETITIA Schmid        CC: No Recipients   Arrived to facility and patient was up in wc in the activity room for coffee and brunch. AOX1 to self only. Smiling and very happy to see me. VS WNL. Lung sounds are clearn, resp even and unlabored. Abd is soft and nontender. Bowel sounds are active. Last BM was yesterday. 2+ pitting edema to BLE. Patient denies any pain at this time. She appears comfortable and no distress. Confirmed with Mia CHOI that new medications did arrive and was given all weekend. Reminded staff to contact hospice with any questions, concerns or change in status.